# Patient Record
Sex: FEMALE | Race: WHITE | NOT HISPANIC OR LATINO | Employment: OTHER | URBAN - METROPOLITAN AREA
[De-identification: names, ages, dates, MRNs, and addresses within clinical notes are randomized per-mention and may not be internally consistent; named-entity substitution may affect disease eponyms.]

---

## 2021-03-04 ENCOUNTER — OFFICE VISIT (OUTPATIENT)
Dept: FAMILY MEDICINE | Facility: CLINIC | Age: 51
End: 2021-03-04
Payer: MEDICARE

## 2021-03-04 VITALS — TEMPERATURE: 98 F | WEIGHT: 80 LBS

## 2021-03-04 DIAGNOSIS — N30.00 ACUTE CYSTITIS WITHOUT HEMATURIA: Primary | ICD-10-CM

## 2021-03-04 DIAGNOSIS — Z00.00 ENCOUNTER FOR ANNUAL WELLNESS EXAM IN MEDICARE PATIENT: ICD-10-CM

## 2021-03-04 DIAGNOSIS — R46.89 EPISODE OF BEHAVIOR CHANGE: ICD-10-CM

## 2021-03-04 DIAGNOSIS — R53.83 FATIGUE, UNSPECIFIED TYPE: ICD-10-CM

## 2021-03-04 PROCEDURE — 99213 OFFICE O/P EST LOW 20 MIN: CPT | Mod: S$GLB,,, | Performed by: NURSE PRACTITIONER

## 2021-03-04 PROCEDURE — 99213 PR OFFICE/OUTPT VISIT, EST, LEVL III, 20-29 MIN: ICD-10-PCS | Mod: S$GLB,,, | Performed by: NURSE PRACTITIONER

## 2021-03-04 RX ORDER — OMEPRAZOLE 40 MG/1
CAPSULE, DELAYED RELEASE ORAL
COMMUNITY
Start: 2021-02-18 | End: 2022-12-02

## 2021-03-04 RX ORDER — LORAZEPAM 0.5 MG/1
0.25 TABLET ORAL 2 TIMES DAILY PRN
Qty: 5 TABLET | Refills: 0 | Status: SHIPPED | OUTPATIENT
Start: 2021-03-04 | End: 2021-04-03

## 2021-03-04 RX ORDER — NYSTATIN 100000 U/G
CREAM TOPICAL
COMMUNITY
Start: 2021-01-29 | End: 2021-11-23

## 2021-03-04 RX ORDER — BUDESONIDE 3 MG/1
CAPSULE, COATED PELLETS ORAL
COMMUNITY
Start: 2021-02-18 | End: 2021-11-23

## 2021-04-08 ENCOUNTER — OFFICE VISIT (OUTPATIENT)
Dept: FAMILY MEDICINE | Facility: CLINIC | Age: 51
End: 2021-04-08
Payer: MEDICARE

## 2021-04-08 VITALS — BODY MASS INDEX: 15.31 KG/M2 | WEIGHT: 78 LBS | HEIGHT: 60 IN

## 2021-04-08 DIAGNOSIS — E83.51 HYPOCALCEMIA: Primary | ICD-10-CM

## 2021-04-08 DIAGNOSIS — Q90.9 TRISOMY 21: ICD-10-CM

## 2021-04-08 PROCEDURE — 99213 PR OFFICE/OUTPT VISIT, EST, LEVL III, 20-29 MIN: ICD-10-PCS | Mod: S$GLB,,, | Performed by: NURSE PRACTITIONER

## 2021-04-08 PROCEDURE — 99213 OFFICE O/P EST LOW 20 MIN: CPT | Mod: S$GLB,,, | Performed by: NURSE PRACTITIONER

## 2021-04-08 RX ORDER — CALCIUM CARBONATE/VITAMIN D3 500 MG-10
1 TABLET,CHEWABLE ORAL ONCE
Qty: 90 EACH | Refills: 1 | Status: SHIPPED | OUTPATIENT
Start: 2021-04-08 | End: 2021-04-08

## 2021-04-08 RX ORDER — METRONIDAZOLE 7.5 MG/G
1 CREAM TOPICAL 2 TIMES DAILY
COMMUNITY
End: 2021-11-23

## 2021-05-12 ENCOUNTER — TELEPHONE (OUTPATIENT)
Dept: FAMILY MEDICINE | Facility: CLINIC | Age: 51
End: 2021-05-12

## 2021-05-12 ENCOUNTER — OFFICE VISIT (OUTPATIENT)
Dept: FAMILY MEDICINE | Facility: CLINIC | Age: 51
End: 2021-05-12
Payer: MEDICARE

## 2021-05-12 VITALS — BODY MASS INDEX: 15.71 KG/M2 | HEIGHT: 60 IN | WEIGHT: 80 LBS

## 2021-05-12 DIAGNOSIS — L03.90 WOUND CELLULITIS: Primary | ICD-10-CM

## 2021-05-12 DIAGNOSIS — R51.9 NONINTRACTABLE HEADACHE, UNSPECIFIED CHRONICITY PATTERN, UNSPECIFIED HEADACHE TYPE: Primary | ICD-10-CM

## 2021-05-12 PROCEDURE — 99214 OFFICE O/P EST MOD 30 MIN: CPT | Mod: S$GLB,,, | Performed by: FAMILY MEDICINE

## 2021-05-12 PROCEDURE — 99214 PR OFFICE/OUTPT VISIT, EST, LEVL IV, 30-39 MIN: ICD-10-PCS | Mod: S$GLB,,, | Performed by: FAMILY MEDICINE

## 2021-05-12 RX ORDER — ACETAMINOPHEN 500 MG
500 TABLET ORAL DAILY
Qty: 90 TABLET | Refills: 1 | Status: SHIPPED | OUTPATIENT
Start: 2021-05-12 | End: 2021-05-12

## 2021-05-12 RX ORDER — CEFADROXIL 500 MG/5ML
1 POWDER, FOR SUSPENSION ORAL 2 TIMES DAILY
Qty: 150 ML | Refills: 0 | Status: SHIPPED | OUTPATIENT
Start: 2021-05-12 | End: 2022-01-21

## 2021-05-12 RX ORDER — FLUCONAZOLE 40 MG/ML
100 POWDER, FOR SUSPENSION ORAL DAILY
Qty: 150 ML | Refills: 0 | Status: SHIPPED | OUTPATIENT
Start: 2021-05-12 | End: 2021-06-11

## 2021-05-12 RX ORDER — ACETAMINOPHEN 160 MG/5ML
325 LIQUID ORAL EVERY 6 HOURS PRN
Qty: 600 ML | Refills: 1 | Status: SHIPPED | OUTPATIENT
Start: 2021-05-12 | End: 2022-12-02

## 2021-05-12 RX ORDER — HYDROXYZINE HYDROCHLORIDE 10 MG/5ML
25 SYRUP ORAL 4 TIMES DAILY
Qty: 150 ML | Refills: 5 | Status: SHIPPED | OUTPATIENT
Start: 2021-05-12 | End: 2021-11-23

## 2021-05-17 ENCOUNTER — PATIENT MESSAGE (OUTPATIENT)
Dept: FAMILY MEDICINE | Facility: CLINIC | Age: 51
End: 2021-05-17

## 2021-06-04 ENCOUNTER — TELEPHONE (OUTPATIENT)
Dept: FAMILY MEDICINE | Facility: CLINIC | Age: 51
End: 2021-06-04

## 2021-07-01 ENCOUNTER — PATIENT MESSAGE (OUTPATIENT)
Dept: ADMINISTRATIVE | Facility: OTHER | Age: 51
End: 2021-07-01

## 2021-10-05 ENCOUNTER — TELEPHONE (OUTPATIENT)
Dept: FAMILY MEDICINE | Facility: CLINIC | Age: 51
End: 2021-10-05

## 2021-11-22 ENCOUNTER — PATIENT OUTREACH (OUTPATIENT)
Dept: ADMINISTRATIVE | Facility: OTHER | Age: 51
End: 2021-11-22
Payer: MEDICARE

## 2021-11-22 ENCOUNTER — OFFICE VISIT (OUTPATIENT)
Dept: PODIATRY | Facility: CLINIC | Age: 51
End: 2021-11-22
Payer: MEDICARE

## 2021-11-22 VITALS — HEIGHT: 55 IN | BODY MASS INDEX: 18.05 KG/M2 | WEIGHT: 78 LBS | RESPIRATION RATE: 16 BRPM

## 2021-11-22 DIAGNOSIS — M20.11 VALGUS DEFORMITY OF BOTH GREAT TOES: ICD-10-CM

## 2021-11-22 DIAGNOSIS — Z12.11 ENCOUNTER FOR FIT (FECAL IMMUNOCHEMICAL TEST) SCREENING: Primary | ICD-10-CM

## 2021-11-22 DIAGNOSIS — M20.42 HAMMER TOES OF BOTH FEET: ICD-10-CM

## 2021-11-22 DIAGNOSIS — M20.12 VALGUS DEFORMITY OF BOTH GREAT TOES: ICD-10-CM

## 2021-11-22 DIAGNOSIS — L97.501 ULCER OF FOOT, LIMITED TO BREAKDOWN OF SKIN, UNSPECIFIED LATERALITY: Primary | ICD-10-CM

## 2021-11-22 DIAGNOSIS — M20.41 HAMMER TOES OF BOTH FEET: ICD-10-CM

## 2021-11-22 DIAGNOSIS — Z12.31 ENCOUNTER FOR SCREENING MAMMOGRAM FOR MALIGNANT NEOPLASM OF BREAST: ICD-10-CM

## 2021-11-22 DIAGNOSIS — B35.1 ONYCHOMYCOSIS DUE TO DERMATOPHYTE: ICD-10-CM

## 2021-11-22 DIAGNOSIS — Q92.9 TRISOMY: ICD-10-CM

## 2021-11-22 PROCEDURE — 99214 OFFICE O/P EST MOD 30 MIN: CPT | Mod: PBBFAC | Performed by: PODIATRIST

## 2021-11-22 PROCEDURE — 99203 PR OFFICE/OUTPT VISIT, NEW, LEVL III, 30-44 MIN: ICD-10-PCS | Mod: S$PBB,,, | Performed by: PODIATRIST

## 2021-11-22 PROCEDURE — 99999 PR PBB SHADOW E&M-EST. PATIENT-LVL IV: CPT | Mod: PBBFAC,,, | Performed by: PODIATRIST

## 2021-11-22 PROCEDURE — 99203 OFFICE O/P NEW LOW 30 MIN: CPT | Mod: S$PBB,,, | Performed by: PODIATRIST

## 2021-11-22 PROCEDURE — 99999 PR PBB SHADOW E&M-EST. PATIENT-LVL IV: ICD-10-PCS | Mod: PBBFAC,,, | Performed by: PODIATRIST

## 2021-11-22 RX ORDER — METRONIDAZOLE 7.5 MG/G
1 CREAM TOPICAL
COMMUNITY
End: 2021-11-23

## 2021-11-22 RX ORDER — SERTRALINE HYDROCHLORIDE 25 MG/1
25 TABLET, FILM COATED ORAL
COMMUNITY
Start: 2021-11-08 | End: 2021-11-23

## 2021-11-22 RX ORDER — MUPIROCIN CALCIUM 20 MG/G
CREAM TOPICAL
COMMUNITY
End: 2021-11-23

## 2021-11-22 RX ORDER — NYSTATIN 100000 U/G
CREAM TOPICAL
COMMUNITY
Start: 2021-01-29 | End: 2021-11-23

## 2021-11-22 RX ORDER — METRONIDAZOLE 10 MG/G
GEL TOPICAL
COMMUNITY
Start: 2021-08-15 | End: 2021-11-23

## 2021-11-22 RX ORDER — ATORVASTATIN CALCIUM 40 MG/1
40 TABLET, FILM COATED ORAL
COMMUNITY
Start: 2021-11-03 | End: 2022-02-15 | Stop reason: SDUPTHER

## 2021-11-22 RX ORDER — OMEPRAZOLE 40 MG/1
1 CAPSULE, DELAYED RELEASE ORAL DAILY
COMMUNITY
Start: 2021-02-18 | End: 2021-11-23 | Stop reason: SDUPTHER

## 2021-11-22 RX ORDER — BUDESONIDE 3 MG/1
1 CAPSULE, COATED PELLETS ORAL DAILY
COMMUNITY
Start: 2021-02-09 | End: 2022-12-02

## 2021-11-22 RX ORDER — CYANOCOBALAMIN (VITAMIN B-12) 250 MCG
250 TABLET ORAL EVERY MORNING
COMMUNITY
Start: 2021-10-06

## 2021-11-22 RX ORDER — LORAZEPAM 0.5 MG/1
0.25 TABLET ORAL 2 TIMES DAILY PRN
Status: ON HOLD | COMMUNITY
Start: 2021-03-04 | End: 2022-11-03

## 2021-11-22 RX ORDER — CALCIUM CARBONATE/VITAMIN D3 500 MG-10
1 TABLET,CHEWABLE ORAL DAILY
COMMUNITY
Start: 2021-04-08

## 2021-11-22 RX ORDER — BUDESONIDE 3 MG/1
9 CAPSULE, COATED PELLETS ORAL
COMMUNITY
Start: 2021-11-16 | End: 2021-11-23

## 2021-11-22 RX ORDER — CYANOCOBALAMIN (VITAMIN B-12) 250 MCG
250 TABLET ORAL
COMMUNITY
Start: 2021-10-05

## 2021-11-23 PROBLEM — M20.42 HAMMER TOES OF BOTH FEET: Status: ACTIVE | Noted: 2021-11-23

## 2021-11-23 PROBLEM — Q92.9 TRISOMY: Status: ACTIVE | Noted: 2021-11-23

## 2021-11-23 PROBLEM — B35.1 ONYCHOMYCOSIS DUE TO DERMATOPHYTE: Status: ACTIVE | Noted: 2021-11-23

## 2021-11-23 PROBLEM — M20.12 VALGUS DEFORMITY OF BOTH GREAT TOES: Status: ACTIVE | Noted: 2021-11-23

## 2021-11-23 PROBLEM — M20.41 HAMMER TOES OF BOTH FEET: Status: ACTIVE | Noted: 2021-11-23

## 2021-11-23 PROBLEM — L97.501 ULCER OF FOOT, LIMITED TO BREAKDOWN OF SKIN: Status: ACTIVE | Noted: 2021-11-23

## 2021-11-23 PROBLEM — M20.11 VALGUS DEFORMITY OF BOTH GREAT TOES: Status: ACTIVE | Noted: 2021-11-23

## 2022-01-10 ENCOUNTER — PATIENT MESSAGE (OUTPATIENT)
Dept: ADMINISTRATIVE | Facility: HOSPITAL | Age: 52
End: 2022-01-10
Payer: MEDICARE

## 2022-01-21 ENCOUNTER — OFFICE VISIT (OUTPATIENT)
Dept: FAMILY MEDICINE | Facility: CLINIC | Age: 52
End: 2022-01-21
Payer: MEDICARE

## 2022-01-21 VITALS — WEIGHT: 75.13 LBS | BODY MASS INDEX: 17.39 KG/M2 | HEIGHT: 55 IN | TEMPERATURE: 98 F

## 2022-01-21 DIAGNOSIS — E78.5 HYPERLIPIDEMIA, UNSPECIFIED HYPERLIPIDEMIA TYPE: ICD-10-CM

## 2022-01-21 DIAGNOSIS — Q90.9 TRISOMY 21: ICD-10-CM

## 2022-01-21 DIAGNOSIS — J06.9 UPPER RESPIRATORY TRACT INFECTION, UNSPECIFIED TYPE: Primary | ICD-10-CM

## 2022-01-21 PROCEDURE — 99213 OFFICE O/P EST LOW 20 MIN: CPT | Mod: S$GLB,,, | Performed by: FAMILY MEDICINE

## 2022-01-21 PROCEDURE — 99213 PR OFFICE/OUTPT VISIT, EST, LEVL III, 20-29 MIN: ICD-10-PCS | Mod: S$GLB,,, | Performed by: FAMILY MEDICINE

## 2022-01-21 RX ORDER — MONTELUKAST SODIUM 10 MG/1
10 TABLET ORAL NIGHTLY
Qty: 10 TABLET | Refills: 0 | Status: SHIPPED | OUTPATIENT
Start: 2022-01-21 | End: 2022-12-02

## 2022-01-21 NOTE — PROGRESS NOTES
Subjective:       Patient ID: Anabella Potter is a 51 y.o. female.    Chief Complaint: Nasal Congestion (2 days)      Review of patient's allergies indicates:  No Known Allergies   Head congested not eating 2 days    Review of Systems   Constitutional: Negative for chills, fatigue, fever and unexpected weight change.   HENT: Negative for ear pain, rhinorrhea, sinus pressure/congestion, sneezing and sore throat.    Eyes: Negative for photophobia and pain.   Respiratory: Negative for chest tightness, shortness of breath and wheezing.    Cardiovascular: Negative for chest pain.   Gastrointestinal: Negative for abdominal distention, abdominal pain, constipation, diarrhea and nausea.   Endocrine: Negative for polydipsia, polyphagia and polyuria.   Genitourinary: Negative for dysuria, frequency, menstrual problem, pelvic pain and urgency.   Musculoskeletal: Negative for back pain and joint swelling.   Integumentary:  Negative for rash, wound, breast mass and breast discharge.   Allergic/Immunologic: Negative for immunocompromised state.   Neurological: Negative for dizziness, vertigo, weakness and headaches.   Psychiatric/Behavioral: Negative for agitation, dysphoric mood and sleep disturbance.   All other systems reviewed and are negative.  Breast: Negative for mass        Objective:      Vitals:    01/21/22 1514   Temp: 98.3 °F (36.8 °C)     Physical Exam  Vitals and nursing note reviewed.   Constitutional:       Appearance: Normal appearance.   HENT:      Head: Normocephalic.      Right Ear: Tympanic membrane normal.      Left Ear: Tympanic membrane normal.      Nose: Nose normal.      Mouth/Throat:      Mouth: Mucous membranes are moist.   Eyes:      Pupils: Pupils are equal, round, and reactive to light.   Cardiovascular:      Rate and Rhythm: Normal rate and regular rhythm.   Pulmonary:      Effort: Pulmonary effort is normal.   Abdominal:      General: Abdomen is flat. Bowel sounds are normal.      Palpations:  Abdomen is soft.   Musculoskeletal:         General: Normal range of motion.   Skin:     General: Skin is warm and dry.   Neurological:      General: No focal deficit present.      Mental Status: She is alert.   Psychiatric:         Mood and Affect: Mood normal.         Behavior: Behavior normal.       does not look toxic,    Assessment:       1. Upper respiratory tract infection, unspecified type    2. Trisomy 21    3. Hyperlipidemia, unspecified hyperlipidemia type        Plan:       Upper respiratory tract infection, unspecified type    Trisomy 21    Hyperlipidemia, unspecified hyperlipidemia type           No follow-ups on file.   Reassurance simple cold

## 2022-01-26 ENCOUNTER — OFFICE VISIT (OUTPATIENT)
Dept: FAMILY MEDICINE | Facility: CLINIC | Age: 52
End: 2022-01-26
Payer: MEDICARE

## 2022-01-26 VITALS — BODY MASS INDEX: 16.98 KG/M2 | TEMPERATURE: 99 F | HEIGHT: 55 IN | RESPIRATION RATE: 18 BRPM | WEIGHT: 73.38 LBS

## 2022-01-26 DIAGNOSIS — J06.9 UPPER RESPIRATORY TRACT INFECTION, UNSPECIFIED TYPE: Primary | ICD-10-CM

## 2022-01-26 DIAGNOSIS — R34 DECREASED URINATION: ICD-10-CM

## 2022-01-26 PROCEDURE — 99213 PR OFFICE/OUTPT VISIT, EST, LEVL III, 20-29 MIN: ICD-10-PCS | Mod: S$GLB,,, | Performed by: NURSE PRACTITIONER

## 2022-01-26 PROCEDURE — 99213 OFFICE O/P EST LOW 20 MIN: CPT | Mod: S$GLB,,, | Performed by: NURSE PRACTITIONER

## 2022-01-26 RX ORDER — CEFDINIR 300 MG/1
300 CAPSULE ORAL 2 TIMES DAILY
Qty: 14 CAPSULE | Refills: 0 | Status: SHIPPED | OUTPATIENT
Start: 2022-01-26 | End: 2022-02-05

## 2022-01-26 RX ORDER — ONDANSETRON 4 MG/1
4 TABLET, ORALLY DISINTEGRATING ORAL EVERY 8 HOURS PRN
Qty: 30 TABLET | Refills: 0 | Status: SHIPPED | OUTPATIENT
Start: 2022-01-26 | End: 2022-12-02

## 2022-01-26 NOTE — PROGRESS NOTES
"Subjective:       Patient ID: Anabella Potter is a 51 y.o. female.    Chief Complaint: Anorexia    Ms. Potter presents with her primary caregiver for "just not being herself'. She has not been willing to eat or drink over the last few days. Last week began having s/s of congestion/ cough on thurs/Friday. Today, the caregiver states that she is still worried, although Kesha seems "better today".  Note that the patient is nonverbal, intellectually disabled, does not allow physical examination/auscultation.    ROS: Unable to be performed due to pt disability        Review of patient's allergies indicates:  No Known Allergies  Objective:     Vitals:    01/26/22 0918   Resp: 18   Temp: 98.6 °F (37 °C)     -WEIGHT   Body mass index is 17.7 kg/m².     Physical Exam  Vitals reviewed: Limited exam due to pt refusal.   Constitutional:       Appearance: Normal appearance.   HENT:      Head: Normocephalic.   Pulmonary:      Effort: Pulmonary effort is normal.      Comments: No significant coughing or noted wheezing.  Abdominal:      General: Abdomen is flat.      Comments: To general inspection   Skin:     General: Skin is warm.      Capillary Refill: Capillary refill takes less than 2 seconds.      Comments: Allows minimal touch   Neurological:      Mental Status: She is alert.   Psychiatric:      Comments: Behavior is stable, she usually does not allow exam         Assessment:       1. Upper respiratory tract infection, unspecified type    2. Decreased urination        Plan:       Upper respiratory tract infection, unspecified type  -     X-Ray Chest PA And Lateral; Future; Expected date: 01/26/2022    Decreased urination  -     URINALYSIS; Future; Expected date: 01/26/2022    Other orders  -     cefdinir (OMNICEF) 300 MG capsule; Take 1 capsule (300 mg total) by mouth 2 (two) times daily. for 10 days  Dispense: 14 capsule; Refill: 0  -     ondansetron (ZOFRAN-ODT) 4 MG TbDL; Take 1 tablet (4 mg total) by mouth every 8 " (eight) hours as needed.  Dispense: 30 tablet; Refill: 0     At this point, if she has had a COVID infection, the 5 day quarantine has . Continue to try to have pt wear a mask, which is difficult.  Empiric trial of medication,   Add ondansetron for nausea prior to meals.  DO UA, collect at [facility], and CXR.  Followup for results, any changes in status.  Last summer she was hospitalized for vitamin deficiency, is still taking those vitamins

## 2022-02-15 RX ORDER — ATORVASTATIN CALCIUM 40 MG/1
40 TABLET, FILM COATED ORAL DAILY
Qty: 90 TABLET | Refills: 1 | Status: SHIPPED | OUTPATIENT
Start: 2022-02-15 | End: 2022-08-25 | Stop reason: SDUPTHER

## 2022-02-15 NOTE — TELEPHONE ENCOUNTER
----- Message from May Cabrera sent at 2/15/2022  3:14 PM CST -----  Contact: pt  Type:  RX Refill Request    Who Called:  pt    Refill or New Rx:  refill    RX Name and Strength:  atorvastatin (LIPITOR) 40 MG tablet    How is the patient currently taking it? (ex. 1XDay):  As Directed  Is this a 30 day or 90 day RX:  90    Preferred Pharmacy with phone number:    [facility] [place] - [address] [address]  [address] [address] [address]  Phone: [phone] Fax: [phone]      Local or Mail Order:  mail    Ordering Provider:  Grey Cedeno Call Back Number:  [phone]    Additional Information:  Please contact pt upon completion-Thank you~

## 2022-03-22 ENCOUNTER — PATIENT MESSAGE (OUTPATIENT)
Dept: ADMINISTRATIVE | Facility: HOSPITAL | Age: 52
End: 2022-03-22
Payer: MEDICARE

## 2022-03-28 ENCOUNTER — TELEPHONE (OUTPATIENT)
Dept: PRIMARY CARE CLINIC | Facility: CLINIC | Age: 52
End: 2022-03-28
Payer: MEDICARE

## 2022-04-01 ENCOUNTER — HOSPITAL ENCOUNTER (OUTPATIENT)
Dept: RADIOLOGY | Facility: CLINIC | Age: 52
Discharge: HOME | End: 2022-04-01
Attending: FAMILY MEDICINE
Payer: MEDICARE

## 2022-04-01 DIAGNOSIS — Z12.31 ENCOUNTER FOR SCREENING MAMMOGRAM FOR MALIGNANT NEOPLASM OF BREAST: ICD-10-CM

## 2022-05-31 ENCOUNTER — PATIENT MESSAGE (OUTPATIENT)
Dept: ADMINISTRATIVE | Facility: HOSPITAL | Age: 52
End: 2022-05-31
Payer: MEDICARE

## 2022-06-03 ENCOUNTER — PATIENT OUTREACH (OUTPATIENT)
Dept: ADMINISTRATIVE | Facility: HOSPITAL | Age: 52
End: 2022-06-03
Payer: MEDICARE

## 2022-06-03 DIAGNOSIS — Z12.31 BREAST CANCER SCREENING BY MAMMOGRAM: Primary | ICD-10-CM

## 2022-06-06 ENCOUNTER — OFFICE VISIT (OUTPATIENT)
Dept: PODIATRY | Facility: CLINIC | Age: 52
End: 2022-06-06
Payer: MEDICARE

## 2022-06-06 VITALS — HEIGHT: 55 IN | BODY MASS INDEX: 15.73 KG/M2 | WEIGHT: 68 LBS

## 2022-06-06 DIAGNOSIS — L97.501 ULCER OF FOOT, LIMITED TO BREAKDOWN OF SKIN, UNSPECIFIED LATERALITY: Primary | ICD-10-CM

## 2022-06-06 DIAGNOSIS — Q92.9 TRISOMY: ICD-10-CM

## 2022-06-06 DIAGNOSIS — L60.0 INGROWN NAIL: ICD-10-CM

## 2022-06-06 DIAGNOSIS — M20.41 HAMMER TOES OF BOTH FEET: ICD-10-CM

## 2022-06-06 DIAGNOSIS — M20.11 VALGUS DEFORMITY OF BOTH GREAT TOES: ICD-10-CM

## 2022-06-06 DIAGNOSIS — M20.12 VALGUS DEFORMITY OF BOTH GREAT TOES: ICD-10-CM

## 2022-06-06 DIAGNOSIS — M20.42 HAMMER TOES OF BOTH FEET: ICD-10-CM

## 2022-06-06 DIAGNOSIS — B35.1 ONYCHOMYCOSIS DUE TO DERMATOPHYTE: ICD-10-CM

## 2022-06-06 DIAGNOSIS — I73.9 PERIPHERAL VASCULAR DISEASE: ICD-10-CM

## 2022-06-06 PROCEDURE — 99214 PR OFFICE/OUTPT VISIT, EST, LEVL IV, 30-39 MIN: ICD-10-PCS | Mod: S$PBB,,, | Performed by: PODIATRIST

## 2022-06-06 PROCEDURE — 99213 OFFICE O/P EST LOW 20 MIN: CPT | Mod: PBBFAC | Performed by: PODIATRIST

## 2022-06-06 PROCEDURE — 99999 PR PBB SHADOW E&M-EST. PATIENT-LVL III: ICD-10-PCS | Mod: PBBFAC,,, | Performed by: PODIATRIST

## 2022-06-06 PROCEDURE — 99999 PR PBB SHADOW E&M-EST. PATIENT-LVL III: CPT | Mod: PBBFAC,,, | Performed by: PODIATRIST

## 2022-06-06 PROCEDURE — 99214 OFFICE O/P EST MOD 30 MIN: CPT | Mod: S$PBB,,, | Performed by: PODIATRIST

## 2022-06-07 NOTE — PROGRESS NOTES
Subjective:       Patient ID: Anabella Potter is a 52 y.o. female.    Chief Complaint: Follow-up, Nail Problem, and Foot Pain    Patient presents today with a chaperone from the facility for which she resides.  Patient's chaperone indicates the patient has had multiple ulcerations on both feet she had gone to Wound Care previously.  Patient does have Down syndrome as well as other related health issues she was non communicative today and somewhat combative.    Past Medical History:   Diagnosis Date    Anorexia     Trisomy      History reviewed. No pertinent surgical history.  History reviewed. No pertinent family history.  Social History     Socioeconomic History    Marital status: Single   Occupational History    Occupation: disabled   Tobacco Use    Smoking status: Never Smoker    Smokeless tobacco: Never Used   Substance and Sexual Activity    Alcohol use: Never    Drug use: Never    Sexual activity: Never       Current Outpatient Medications   Medication Sig Dispense Refill    acetaminophen (TYLENOL) 160 mg/5 mL Liqd Take 10.2 mLs (326.4 mg total) by mouth every 6 (six) hours as needed (PAIN). 600 mL 1    atorvastatin (LIPITOR) 40 MG tablet Take 1 tablet (40 mg total) by mouth once daily. 90 tablet 1    budesonide (ENTOCORT EC) 3 mg capsule Take 1 capsule by mouth once daily.      calcium carbonate-vitamin D3 500 mg(1,250mg) -400 unit Chew Take 1 tablet by mouth once daily.      cyanocobalamin (VITAMIN B-12) 250 MCG tablet Take 250 mcg by mouth.      LORazepam (ATIVAN) 0.5 MG tablet Take 0.25 mg by mouth 2 (two) times daily as needed.      montelukast (SINGULAIR) 10 mg tablet Take 1 tablet (10 mg total) by mouth every evening. 10 tablet 0    omeprazole (PRILOSEC) 40 MG capsule       ondansetron (ZOFRAN-ODT) 4 MG TbDL Take 1 tablet (4 mg total) by mouth every 8 (eight) hours as needed. 30 tablet 0    VITAMIN B-12 250 MCG tablet Take 250 mcg by mouth every morning.       No current  "facility-administered medications for this visit.     Review of patient's allergies indicates:  No Known Allergies    Review of Systems   Constitutional: Positive for appetite change.   Musculoskeletal: Positive for arthralgias, gait problem and joint swelling.   Skin: Positive for color change and wound.   All other systems reviewed and are negative.      Objective:      Vitals:    06/06/22 1144   Weight: 30.8 kg (68 lb)   Height: 4' 6" (1.372 m)     Physical Exam  Vitals and nursing note reviewed. Exam conducted with a chaperone present.   Constitutional:       Appearance: She is ill-appearing.   Cardiovascular:      Pulses:           Dorsalis pedis pulses are 0 on the right side and 0 on the left side.        Posterior tibial pulses are 0 on the right side and 0 on the left side.   Pulmonary:      Effort: Pulmonary effort is normal.   Musculoskeletal:         General: Tenderness and deformity present.      Right foot: Decreased range of motion. Deformity, bunion and prominent metatarsal heads present.      Left foot: Decreased range of motion. Deformity, bunion and prominent metatarsal heads present.        Feet:    Feet:      Right foot:      Protective Sensation: 2 sites tested. 2 sites sensed.      Skin integrity: Skin breakdown, callus and dry skin present.      Toenail Condition: Right toenails are abnormally thick and long. Fungal disease present.     Left foot:      Protective Sensation: 2 sites tested. 2 sites sensed.      Skin integrity: Skin breakdown, callus and dry skin present.      Toenail Condition: Left toenails are abnormally thick and long. Fungal disease present.  Skin:     Capillary Refill: Capillary refill takes more than 3 seconds.      Findings: Erythema and lesion present.   Neurological:      General: No focal deficit present.   Psychiatric:         Speech: She is noncommunicative.         Behavior: Behavior is agitated.          Assessment:       1. Ulcer of foot, limited to breakdown of " skin, unspecified laterality    2. Hammer toes of both feet    3. Ingrown nail    4. Peripheral vascular disease    5. Trisomy    6. Onychomycosis due to dermatophyte    7. Valgus deformity of both great toes        Plan:       Patient presents today with a chaperone from the facility for which she resides.  Patient's chaperone indicates the patient has had multiple ulcerations on both feet she had gone to Wound Care previously.  Patient does have Down syndrome as well as other related health issues she was non communicative today and somewhat combative.   Evaluation of the patient was did very difficult today she did not want me looking at her feeding kept pulling her feet away she does have multiple digits with purple blue hue of the digits and pre ulcerative hyperkeratotic lesions on the plantar forefoot secondary to digital contracture and bunion deformity bilateral.  Patient does have obvious signs of fungal infection of the toenails many of the toenails are long they are discolored dystrophic thickened with findings consistent with onychomycosis.  Patient's chaperone indicated the patient was given Valium prior to today's visit however the patient was still very combative and made it very difficult to evaluate the patient's feet and treat the patient.  The chaperone indicated she has been given permission to restrain the patient so that I am able to trim the ingrowing toenails my nursing staff also helped to respectfully and appropriately restrained the patient to prevent injury to the patient's foot while trimming the ingrowing toenails which were present on both feet.  I was not able to debride the pre ulcerative hyperkeratotic lesions because of the patient's kicking and constant pulling of her feet I was fearful that utilizing a scalpel to do this could lead to injury so I did not recommend this instead I have recommended the application of 40% urea cream applied to the patient's feet twice a day every day  this will slowly over time breakdown the callus tissue and buildup which should give the patient significant relief.  Recommended follow-up is going to be as needed.  Patient did have a progressive vascular compromise in the 2nd digit on the right the toe was purple on examination there were no signs of infection in the toe itself for surrounding skin.  This note was created using M*Modal voice recognition software that occasionally misinterpreted phrases or words.

## 2022-08-24 ENCOUNTER — PATIENT MESSAGE (OUTPATIENT)
Dept: ADMINISTRATIVE | Facility: HOSPITAL | Age: 52
End: 2022-08-24
Payer: MEDICARE

## 2022-08-25 RX ORDER — ATORVASTATIN CALCIUM 40 MG/1
40 TABLET, FILM COATED ORAL DAILY
Qty: 90 TABLET | Refills: 1 | Status: SHIPPED | OUTPATIENT
Start: 2022-08-25 | End: 2023-05-26 | Stop reason: SDUPTHER

## 2022-08-25 NOTE — TELEPHONE ENCOUNTER
Last Office Visit: 1/21/2022    ----- Message from Anabella Remington sent at 8/25/2022  9:44 AM CDT -----  Contact: teresa ornelas  Type:  RX Refill Request    Who Called: teresa ornelas  Refill or New Rx:  refill  RX Name and Strength:  atorvastatin (LIPITOR) 40 MG tablet 90 tablet   Sig - Route: Take 1 tablet (40 mg total) by mouth once daily. - Oral      How is the patient currently taking it? (ex. 1XDay):  1x  Is this a 30 day or 90 day RX:  90  Preferred Pharmacy with phone number:    [facility] [place] [facility] [id number] [address]  Phone: [phone] Fax: [phone]        Local or Mail Order:  local  Ordering Provider:  brooke Cedeno Call Back Number:  [phone]  Additional Information:

## 2022-09-12 ENCOUNTER — TELEPHONE (OUTPATIENT)
Dept: FAMILY MEDICINE | Facility: CLINIC | Age: 52
End: 2022-09-12
Payer: MEDICARE

## 2022-09-12 NOTE — TELEPHONE ENCOUNTER
Call placed to Ana/Joni Szymanski states the patient is uncomfortable and the affected area continues drain with green drainage.    ----- Message from Anabella Macedo sent at 9/12/2022  4:11 PM CDT -----  Contact: pt  Type:  RX Refill Request    Who Called:  angelia szymanski  Refill or New Rx:  rx  RX Name and Strength:  pt had a boil on butt has busted open, needs some kind of cream  How is the patient currently taking it? (ex. 1XDay):  as order  Is this a 30 day or 90 day RX:  as order  Preferred Pharmacy with phone number:        [id number] - [place], [place] - [address]  [address]  [place] [address]  Phone: [phone] Fax: [phone]          Local or Mail Order:  local  Ordering Provider:  brooke Cedeno Call Back Number:  [phone]  Additional Information:

## 2022-09-12 NOTE — TELEPHONE ENCOUNTER
Second call made to Ana/Joni Szymanski with 's orders and informed her that medications has been sent to [facility] for patient.      Message duplicate. Original message sent to Ms. Ocasio-High priority.  Call placed to Ana/Joni Szymanski states the patient is uncomfortable and the affected area continues drain with green drainage.    ----- Message from Anabella Macedo sent at 9/12/2022  4:11 PM CDT -----  Contact: pt  Type:  RX Refill Request    Who Called:  angelia szymanski  Refill or New Rx:  rx  RX Name and Strength:  pt had a boil on butt has busted open, needs some kind of cream  How is the patient currently taking it? (ex. 1XDay):  as order  Is this a 30 day or 90 day RX:  as order  Preferred Pharmacy with phone number:        [id number] - [place], [place] - [address]  [address]  [facility] [address]  Phone: [phone] Fax: [phone]          Local or Mail Order:  local  Ordering Provider:  brooke Cedeno Call Back Number:  [phone]  Additional Information:

## 2022-09-13 RX ORDER — MUPIROCIN 20 MG/G
OINTMENT TOPICAL 3 TIMES DAILY
Qty: 30 G | Refills: 1 | Status: SHIPPED | OUTPATIENT
Start: 2022-09-13 | End: 2022-12-02

## 2022-09-13 RX ORDER — SULFAMETHOXAZOLE AND TRIMETHOPRIM 800; 160 MG/1; MG/1
1 TABLET ORAL 2 TIMES DAILY
Qty: 20 TABLET | Refills: 0 | Status: ON HOLD | OUTPATIENT
Start: 2022-09-13 | End: 2022-11-03

## 2022-10-10 ENCOUNTER — PATIENT MESSAGE (OUTPATIENT)
Dept: ADMINISTRATIVE | Facility: HOSPITAL | Age: 52
End: 2022-10-10
Payer: MEDICARE

## 2022-11-02 ENCOUNTER — HOSPITAL ENCOUNTER (INPATIENT)
Facility: HOSPITAL | Age: 52
LOS: 2 days | Discharge: HOME | DRG: 086 | End: 2022-11-04
Attending: EMERGENCY MEDICINE | Admitting: PSYCHIATRY & NEUROLOGY
Payer: MEDICARE

## 2022-11-02 DIAGNOSIS — I60.9 SAH (SUBARACHNOID HEMORRHAGE): ICD-10-CM

## 2022-11-02 DIAGNOSIS — R55 SYNCOPE AND COLLAPSE: ICD-10-CM

## 2022-11-02 DIAGNOSIS — R56.9 SEIZURE: ICD-10-CM

## 2022-11-02 DIAGNOSIS — I60.9 SUBARACHNOID HEMORRHAGE: Primary | ICD-10-CM

## 2022-11-02 DIAGNOSIS — R55 SYNCOPE: ICD-10-CM

## 2022-11-02 PROBLEM — F81.9 INTELLECTUAL DELAY: Status: ACTIVE | Noted: 2022-11-02

## 2022-11-02 LAB
ABO + RH BLD: NORMAL
BACTERIA #/AREA URNS AUTO: NORMAL /HPF
BASOPHILS # BLD AUTO: 0.05 K/UL (ref 0–0.2)
BASOPHILS NFR BLD: 0.7 % (ref 0–1.9)
BILIRUB UR QL STRIP: NEGATIVE
BLD GP AB SCN CELLS X3 SERPL QL: NORMAL
CHOLEST SERPL-MCNC: 142 MG/DL (ref 120–199)
CHOLEST/HDLC SERPL: 3 {RATIO} (ref 2–5)
CLARITY UR REFRACT.AUTO: CLEAR
COLOR UR AUTO: YELLOW
DIFFERENTIAL METHOD: ABNORMAL
EOSINOPHIL # BLD AUTO: 0 K/UL (ref 0–0.5)
EOSINOPHIL NFR BLD: 0 % (ref 0–8)
ERYTHROCYTE [DISTWIDTH] IN BLOOD BY AUTOMATED COUNT: 14.2 % (ref 11.5–14.5)
ESTIMATED AVG GLUCOSE: 111 MG/DL (ref 68–131)
GLUCOSE UR QL STRIP: NEGATIVE
HBA1C MFR BLD: 5.5 % (ref 4–5.6)
HCT VFR BLD AUTO: 41.8 % (ref 37–48.5)
HDLC SERPL-MCNC: 48 MG/DL (ref 40–75)
HDLC SERPL: 33.8 % (ref 20–50)
HGB BLD-MCNC: 13.9 G/DL (ref 12–16)
HGB UR QL STRIP: NEGATIVE
IMM GRANULOCYTES # BLD AUTO: 0.03 K/UL (ref 0–0.04)
IMM GRANULOCYTES NFR BLD AUTO: 0.4 % (ref 0–0.5)
INR PPP: 1 (ref 0.8–1.2)
KETONES UR QL STRIP: NEGATIVE
LDLC SERPL CALC-MCNC: 83 MG/DL (ref 63–159)
LEUKOCYTE ESTERASE UR QL STRIP: ABNORMAL
LYMPHOCYTES # BLD AUTO: 1 K/UL (ref 1–4.8)
LYMPHOCYTES NFR BLD: 13.8 % (ref 18–48)
MCH RBC QN AUTO: 33.8 PG (ref 27–31)
MCHC RBC AUTO-ENTMCNC: 33.3 G/DL (ref 32–36)
MCV RBC AUTO: 102 FL (ref 82–98)
MICROSCOPIC COMMENT: NORMAL
MONOCYTES # BLD AUTO: 0.5 K/UL (ref 0.3–1)
MONOCYTES NFR BLD: 7.1 % (ref 4–15)
NEUTROPHILS # BLD AUTO: 5.4 K/UL (ref 1.8–7.7)
NEUTROPHILS NFR BLD: 78 % (ref 38–73)
NITRITE UR QL STRIP: NEGATIVE
NONHDLC SERPL-MCNC: 94 MG/DL
NRBC BLD-RTO: 0 /100 WBC
PH UR STRIP: 8 [PH] (ref 5–8)
PLATELET # BLD AUTO: 261 K/UL (ref 150–450)
PMV BLD AUTO: 10.5 FL (ref 9.2–12.9)
PROT UR QL STRIP: NEGATIVE
PROTHROMBIN TIME: 10.9 SEC (ref 9–12.5)
RBC # BLD AUTO: 4.11 M/UL (ref 4–5.4)
RBC #/AREA URNS AUTO: 2 /HPF (ref 0–4)
SP GR UR STRIP: 1.01 (ref 1–1.03)
T4 FREE SERPL-MCNC: 0.65 NG/DL (ref 0.71–1.51)
TRIGL SERPL-MCNC: 55 MG/DL (ref 30–150)
TSH SERPL DL<=0.005 MIU/L-ACNC: 19.8 UIU/ML (ref 0.4–4)
URN SPEC COLLECT METH UR: ABNORMAL
WBC # BLD AUTO: 6.9 K/UL (ref 3.9–12.7)
WBC #/AREA URNS AUTO: 3 /HPF (ref 0–5)

## 2022-11-02 PROCEDURE — 63600175 PHARM REV CODE 636 W HCPCS: Performed by: EMERGENCY MEDICINE

## 2022-11-02 PROCEDURE — 81001 URINALYSIS AUTO W/SCOPE: CPT | Performed by: PHYSICIAN ASSISTANT

## 2022-11-02 PROCEDURE — 99291 CRITICAL CARE FIRST HOUR: CPT | Mod: ,,, | Performed by: EMERGENCY MEDICINE

## 2022-11-02 PROCEDURE — 83036 HEMOGLOBIN GLYCOSYLATED A1C: CPT | Performed by: PHYSICIAN ASSISTANT

## 2022-11-02 PROCEDURE — 99285 EMERGENCY DEPT VISIT HI MDM: CPT | Mod: 25

## 2022-11-02 PROCEDURE — 85025 COMPLETE CBC W/AUTO DIFF WBC: CPT | Performed by: EMERGENCY MEDICINE

## 2022-11-02 PROCEDURE — 93005 ELECTROCARDIOGRAM TRACING: CPT

## 2022-11-02 PROCEDURE — 93010 EKG 12-LEAD: ICD-10-PCS | Mod: ,,, | Performed by: INTERNAL MEDICINE

## 2022-11-02 PROCEDURE — 99291 PR CRITICAL CARE, E/M 30-74 MINUTES: ICD-10-PCS | Mod: FS,,, | Performed by: PHYSICIAN ASSISTANT

## 2022-11-02 PROCEDURE — 99291 PR CRITICAL CARE, E/M 30-74 MINUTES: ICD-10-PCS | Mod: ,,, | Performed by: EMERGENCY MEDICINE

## 2022-11-02 PROCEDURE — 86901 BLOOD TYPING SEROLOGIC RH(D): CPT | Performed by: EMERGENCY MEDICINE

## 2022-11-02 PROCEDURE — 96374 THER/PROPH/DIAG INJ IV PUSH: CPT

## 2022-11-02 PROCEDURE — 80061 LIPID PANEL: CPT | Performed by: PHYSICIAN ASSISTANT

## 2022-11-02 PROCEDURE — 85610 PROTHROMBIN TIME: CPT | Performed by: EMERGENCY MEDICINE

## 2022-11-02 PROCEDURE — 94761 N-INVAS EAR/PLS OXIMETRY MLT: CPT

## 2022-11-02 PROCEDURE — 93010 ELECTROCARDIOGRAM REPORT: CPT | Mod: ,,, | Performed by: INTERNAL MEDICINE

## 2022-11-02 PROCEDURE — 99291 CRITICAL CARE FIRST HOUR: CPT | Mod: FS,,, | Performed by: PHYSICIAN ASSISTANT

## 2022-11-02 PROCEDURE — 84439 ASSAY OF FREE THYROXINE: CPT | Performed by: PHYSICIAN ASSISTANT

## 2022-11-02 PROCEDURE — 20000000 HC ICU ROOM

## 2022-11-02 PROCEDURE — 84443 ASSAY THYROID STIM HORMONE: CPT | Performed by: PHYSICIAN ASSISTANT

## 2022-11-02 RX ORDER — LABETALOL HCL 20 MG/4 ML
10 SYRINGE (ML) INTRAVENOUS EVERY 4 HOURS PRN
Status: DISCONTINUED | OUTPATIENT
Start: 2022-11-02 | End: 2022-11-04 | Stop reason: HOSPADM

## 2022-11-02 RX ORDER — LEVETIRACETAM 250 MG/1
250 TABLET ORAL EVERY 12 HOURS
Status: DISCONTINUED | OUTPATIENT
Start: 2022-11-03 | End: 2022-11-03

## 2022-11-02 RX ORDER — ATORVASTATIN CALCIUM 40 MG/1
40 TABLET, FILM COATED ORAL DAILY
Status: DISCONTINUED | OUTPATIENT
Start: 2022-11-03 | End: 2022-11-04 | Stop reason: HOSPADM

## 2022-11-02 RX ORDER — LEVETIRACETAM 500 MG/5ML
1500 INJECTION, SOLUTION, CONCENTRATE INTRAVENOUS
Status: COMPLETED | OUTPATIENT
Start: 2022-11-02 | End: 2022-11-02

## 2022-11-02 RX ORDER — ACETAMINOPHEN 325 MG/1
650 TABLET ORAL EVERY 6 HOURS PRN
Status: DISCONTINUED | OUTPATIENT
Start: 2022-11-02 | End: 2022-11-04 | Stop reason: HOSPADM

## 2022-11-02 RX ORDER — SODIUM CHLORIDE 0.9 % (FLUSH) 0.9 %
10 SYRINGE (ML) INJECTION
Status: DISCONTINUED | OUTPATIENT
Start: 2022-11-02 | End: 2022-11-04 | Stop reason: HOSPADM

## 2022-11-02 RX ORDER — AMOXICILLIN 250 MG
1 CAPSULE ORAL 2 TIMES DAILY
Status: DISCONTINUED | OUTPATIENT
Start: 2022-11-02 | End: 2022-11-03

## 2022-11-02 RX ORDER — ONDANSETRON 2 MG/ML
4 INJECTION INTRAMUSCULAR; INTRAVENOUS EVERY 8 HOURS PRN
Status: DISCONTINUED | OUTPATIENT
Start: 2022-11-02 | End: 2022-11-04 | Stop reason: HOSPADM

## 2022-11-02 RX ORDER — MIDAZOLAM HYDROCHLORIDE 1 MG/ML
0.5 INJECTION INTRAMUSCULAR; INTRAVENOUS EVERY 5 MIN PRN
Status: COMPLETED | OUTPATIENT
Start: 2022-11-03 | End: 2022-11-03

## 2022-11-02 RX ADMIN — LEVETIRACETAM 1500 MG: 100 INJECTION, SOLUTION INTRAVENOUS at 03:11

## 2022-11-02 NOTE — ED NOTES
Patient identifiers for Anabella Potter 52 y.o. female checked and correct.  Chief Complaint   Patient presents with    transfer     Transfer from [facility] for [facility]; given 2mg ativan total by flight care PTA to ED     Past Medical History:   Diagnosis Date    Anorexia     Trisomy      Allergies reported: Review of patient's allergies indicates:  No Known Allergies      LOC: Patient is NOT awake, alert, and aware of environment with an appropriate affect, Hx MR. Patient is disoriented x 4 and not speaking appropriately, unclear if this is pt's baseline.   APPEARANCE: Patient resting comfortably and in no acute distress. Patient is clean and well groomed, patient's clothing is properly fastened.  HEENT: Pt presents with surgical mask on.   SKIN: The skin is warm and dry. Patient has normal skin turgor and moist mucus membranes. Large hematoma to right eye.    MUSKULOSKELETAL: Patient is moving all extremities well, no obvious deformities noted outside of facial hematoma. Bruising noted to medial left knee area. Pulses intact.   RESPIRATORY: Airway is open and patent. Respirations are spontaneous and non-labored with normal effort and rate.  CARDIAC: Patient has a normal rate and rhythm. 76 on cardiac monitor. No peripheral edema noted.   ABDOMEN: No distention noted. Soft and non-tender upon palpation.  NEUROLOGICAL: pupils 3 mm, PERRL. Facial expression is symmetrical. Hand grasps are equal bilaterally.

## 2022-11-02 NOTE — ED PROVIDER NOTES
"Encounter Date: 11/2/2022       History     Chief Complaint   Patient presents with    transfer     Transfer from [facility] for SAH; given 2mg ativan total by flight care PTA to ED     52-year-old female, history of intellectual disability secondary to Down syndrome, lives in group home in [place], seen at outside hospital today status post what sounds like a seizure episode leading to head trauma.  Patient was evaluated in that department with a CT head and face and found to have a small volume subarachnoid hemorrhage.  She was given Ativan 2 mg IV and Benadryl at the outside facility.  Per her caregivers who were now at bedside patient has no history of seizures but did have a "mini-stroke" last year but has no residual deficits.  Her baseline mental status is she can ambulate, is very interactive though is mostly nonverbal.      IMPRESSION:   ACUTE, SMALL VOLUME, SUBARACHNOID HEMORRHAGE WITHIN THE INFERIOR RIGHT   FRONTAL LOBE, ANTERIOR RIGHT TEMPORAL LOBE, AND TO A LESSER EXTENT   WITHIN THE PARAMEDIAN LEFT FRONTAL LOBE.  TRACE SUBARACHNOID HEMORRHAGE   BETWEEN THE CEREBELLAR FOLIA ON THE RIGHT.   NO HYDROCEPHALUS OR HERNIATION.   MILD TO MODERATE RIGHT PRESEPTAL HEMATOMA WITHOUT CT EVIDENCE OF ACUTE   INTRAORBITAL TRAUMATIC INJURY OR ACUTE MAXILLOFACIAL FRACTURE.   CHRONIC LEFT NASAL BONE DEFECT AND CHRONIC HEALED LEFT ZYGOMATIC ARCH   FRACTURE.   EXTENSIVE BILATERAL MAXILLARY AND MANDIBULAR DENTAL CARIES.   ADVANCED MULTILEVEL CERVICAL DEGENERATIVE DISC DISEASE.        The history is provided by the EMS personnel.   Review of patient's allergies indicates:  No Known Allergies  Past Medical History:   Diagnosis Date    Anorexia     Trisomy      No past surgical history on file.  No family history on file.  Social History     Tobacco Use    Smoking status: Never    Smokeless tobacco: Never   Substance Use Topics    Alcohol use: Never    Drug use: Never     Review of Systems   Unable to perform ROS: " Mental status change     Physical Exam     Initial Vitals [11/02/22 1359]   BP Pulse Resp Temp SpO2   (!) 106/58 70 20 97.5 °F (36.4 °C) 99 %      MAP       --         Physical Exam    Nursing note and vitals reviewed.  Constitutional: She appears well-developed and well-nourished. She appears lethargic.   HENT:   Mouth/Throat: Oropharynx is clear and moist.   Right periorbital ecchymosis and edema   Eyes: Conjunctivae and EOM are normal. Pupils are equal, round, and reactive to light.   Neck: Neck supple.   Cardiovascular:  Normal rate.           Pulmonary/Chest: No respiratory distress.   Abdominal: Abdomen is soft. She exhibits no distension. There is no abdominal tenderness. There is no rebound and no guarding.   Musculoskeletal:      Cervical back: Neck supple.      Comments: Ecchymosis over patient's left thigh  Palpated along patient's upper and lower extremities and does not seem to have bony tenderness     Neurological: She appears lethargic.   Lethargic but arousable to voice  Moves all extremities equally though does not follow commands well   Skin: Skin is warm and dry.   Psychiatric: Cognition and memory are impaired.       ED Course   Procedures  Labs Reviewed   CBC W/ AUTO DIFFERENTIAL - Abnormal; Notable for the following components:       Result Value     (*)     MCH 33.8 (*)     Gran % 78.0 (*)     Lymph % 13.8 (*)     All other components within normal limits   PROTIME-INR   URINALYSIS, REFLEX TO URINE CULTURE   LIPID PANEL   HEMOGLOBIN A1C   TSH   TYPE & SCREEN   POCT GLUCOSE MONITORING CONTINUOUS          Imaging Results               CT Head Without Contrast (Final result)  Result time 11/02/22 19:22:06      Final result by Ravi Lord MD (11/02/22 19:22:06)                   Impression:      Small amount of subarachnoid hemorrhage within right inferior frontal lobe and right anterior temporal lobe.  No midline shift or acute large vascular territory infarct.    Few additional  findings as above.    This report was flagged in Epic as abnormal.    Electronically signed by resident: Saturnino Lindsay  Date:    11/02/2022  Time:    19:07    Electronically signed by: Ravi Lord MD  Date:    11/02/2022  Time:    19:22               Narrative:    EXAMINATION:  CT HEAD WITHOUT CONTRAST    CLINICAL HISTORY:  Subarachnoid hemorrhage (SAH) suspected;Stroke, follow up;f/u [facility];    TECHNIQUE:  Low dose axial CT images obtained throughout the head without the use of intravenous contrast.  Axial, sagittal and coronal reconstructions were performed.    COMPARISON:  None    FINDINGS:  The brain parenchyma demonstrates small amount of subarachnoid hemorrhage within the inferior aspect of the right frontal lobe and anterior aspect of the right temporal lobe.    Mild patchy hypoattenuation in the supratentorial white matter, nonspecific but could reflect chronic microvascular ischemic changes.    No acute major vascular distribution infarct.  No mass effect or midline shift.    Ventricles are prominent in size.  No hydrocephalus.    No extra-axial blood or fluid collections are identified.    The sellar region and craniocervical junction are unremarkable.    Orbits are without significant abnormality.    There is soft tissue prominence overlying the right frontal and supraorbital calvarium extending to the right periorbital region suggesting soft tissue swelling/contusion.  No displaced calvarial fracture.    Mastoid air cells and paranasal sinuses are essentially clear.                                       CT Cervical Spine Without Contrast (Final result)  Result time 11/02/22 16:10:18      Final result by Dea Martin MD (11/02/22 16:10:18)                   Impression:      No acute fractures identified.    Spondylosis of the cervical spine as detailed above, no apparent canal stenosis or foraminal narrowing.    Cerumen impacted right external ear canal      Electronically signed by: Dea  MD Veronica  Date:    11/02/2022  Time:    16:10               Narrative:    EXAMINATION:  CT CERVICAL SPINE WITHOUT CONTRAST    CLINICAL HISTORY:  Neck trauma, midline tenderness (Age 16-64y);    TECHNIQUE:  Low dose axial images, sagittal and coronal reformations were performed though the cervical spine.  Contrast was not administered.    COMPARISON:  None    FINDINGS:  The alignment the cervical is normal.  The vertebral body heights are well maintained severe disc space narrowing at C2-3, C4-5 and C5-6 consistent with degenerative disc disease    No fracture, no osseous lesion seen    Cerumen impacted right external ear canal.  The left external ear canal is not included in the field of view.    The C1 arch is intact.    The atlantooccipital joints are intact.    The bilateral lateral masses of C1-2 are intact.    C2-C3: Minimal posterior disc osteophyte, no canal stenosis or foraminal narrowing.  Mild right facet joint osseous hypertrophy.    C3-C4: No apparent canal stenosis or foraminal narrowing.    C4-C5: No apparent canal stenosis or foraminal narrowing. Chronic appearing air containing (vacuo phenomenon) cystic changes within the C5 vertebrae.    C5-C6: Mild posterior disc osteophyte complex, no canal stenosis or foraminal narrowing.    C6-C7: Mild disc bulge, small right paracentral disc protrusion, no apparent canal stenosis or foraminal narrowing.    C7-T1: Unremarkable    The upper thoracic spine appears normal.  The upper lung zones appear normal.  The visualized paravertebral soft tissues appear normal.                                       Medications   sodium chloride 0.9% flush 10 mL (has no administration in time range)   labetalol 20 mg/4 mL (5 mg/mL) IV syring (has no administration in time range)   senna-docusate 8.6-50 mg per tablet 1 tablet (1 tablet Oral Not Given 11/2/22 2145)   ondansetron injection 4 mg (has no administration in time range)   atorvastatin tablet 40 mg (has no  administration in time range)   levETIRAcetam tablet 250 mg (has no administration in time range)   acetaminophen tablet 650 mg (has no administration in time range)   levETIRAcetam injection 1,500 mg (1,500 mg Intravenous Given 11/2/22 1523)     Medical Decision Making:   History:   Old Medical Records: I decided to obtain old medical records.  Initial Assessment:   1. New onset seizure  -unclear if patient had a seizure because she fell and hit her head with a head bleed versus she fell and hit her head because she was having a seizure?  Seems like it is last likely the latter.  She received Ativan at the outside facility but I have also loaded her with Keppra here.  She is mildly somnolent here and I suspect that is a combination of the Ativan that she got and also being postictal.    2. Subarachnoid hemorrhage, likely traumatic  -neurosurgery consulted  -repeat CT head ordered  -patient had a CT head and face at the outside facility but will add on a CT C-spine given that her mental status is depressed and I can not accurately assess her C-spine  Clinical Tests:   Lab Tests: Ordered and Reviewed  Radiological Study: Ordered and Reviewed  Medical Tests: Reviewed and Ordered  ED Management:  Labs repeated  CT C-spine added  CT head shows a right frontal subarachnoid hemorrhage  Neurosurgery recommends admission to the neuro ICU and neuro ICU team has been consulted for admission.  Neuro status remains stable  Other:   I have discussed this case with another health care provider.       <> Summary of the Discussion: Neuro ICU ALEXUS  Neurosurgery resident          Attending Attestation:         Attending Critical Care:   Critical Care Times:   ==============================================================  Total Critical Care Time - exclusive of procedural time: 35 minutes.  ==============================================================  Critical care was necessary to treat or prevent imminent or life-threatening  deterioration of the following conditions: CNS failure.   The following critical care procedures were done by me (see procedure notes): pulse oximetry.   Critical care was time spent personally by me on the following activities: obtaining history from patient or relative, examination of patient, review of old charts, review of x-rays / CT sent with the patient, ordering lab, x-rays, and/or EKG, development of treatment plan with patient or relative, ordering and performing treatments and interventions, evaluation of patient's response to treatment, discussion with consultants and re-evaluation of patient's conition.   Critical Care Condition: life-threatening                      Clinical Impression:   Final diagnoses:  [R56.9] Seizure  [I60.9] Subarachnoid hemorrhage (Primary)      ED Disposition Condition    Admit Stable                Gabriella Meza MD  11/02/22 7704

## 2022-11-02 NOTE — ED NOTES
Pt sleeping in bed.  Resp even/non-labored, VVS.  Siderails up x 2/call light in reach.  Will continue to monitor.

## 2022-11-03 PROBLEM — E03.9 PRIMARY HYPOTHYROIDISM: Status: ACTIVE | Noted: 2022-11-03

## 2022-11-03 PROBLEM — Q90.9 DOWN SYNDROME: Status: ACTIVE | Noted: 2022-11-03

## 2022-11-03 LAB
ALBUMIN SERPL BCP-MCNC: 3.2 G/DL (ref 3.5–5.2)
ALP SERPL-CCNC: 53 U/L (ref 55–135)
ALT SERPL W/O P-5'-P-CCNC: 38 U/L (ref 10–44)
ANION GAP SERPL CALC-SCNC: 7 MMOL/L (ref 8–16)
ASCENDING AORTA: 2.14 CM
AST SERPL-CCNC: 60 U/L (ref 10–40)
AV INDEX (PROSTH): 0.61
AV MEAN GRADIENT: 2 MMHG
AV PEAK GRADIENT: 2 MMHG
AV VALVE AREA: 1.55 CM2
AV VELOCITY RATIO: 0.76
BASOPHILS # BLD AUTO: 0.06 K/UL (ref 0–0.2)
BASOPHILS NFR BLD: 0.7 % (ref 0–1.9)
BILIRUB SERPL-MCNC: 0.7 MG/DL (ref 0.1–1)
BSA FOR ECHO PROCEDURE: 1.1 M2
BUN SERPL-MCNC: 14 MG/DL (ref 6–20)
CALCIUM SERPL-MCNC: 8.5 MG/DL (ref 8.7–10.5)
CHLORIDE SERPL-SCNC: 102 MMOL/L (ref 95–110)
CO2 SERPL-SCNC: 25 MMOL/L (ref 23–29)
CREAT SERPL-MCNC: 0.7 MG/DL (ref 0.5–1.4)
CV ECHO LV RWT: 0.54 CM
DIFFERENTIAL METHOD: ABNORMAL
DOP CALC AO PEAK VEL: 0.79 M/S
DOP CALC AO VTI: 17.64 CM
DOP CALC LVOT AREA: 2.5 CM2
DOP CALC LVOT DIAMETER: 1.8 CM
DOP CALC LVOT PEAK VEL: 0.6 M/S
DOP CALC LVOT STROKE VOLUME: 27.27 CM3
DOP CALCLVOT PEAK VEL VTI: 10.72 CM
ECHO LV POSTERIOR WALL: 0.94 CM (ref 0.6–1.1)
EJECTION FRACTION: 60 %
EOSINOPHIL # BLD AUTO: 0 K/UL (ref 0–0.5)
EOSINOPHIL NFR BLD: 0 % (ref 0–8)
ERYTHROCYTE [DISTWIDTH] IN BLOOD BY AUTOMATED COUNT: 14 % (ref 11.5–14.5)
EST. GFR  (NO RACE VARIABLE): >60 ML/MIN/1.73 M^2
FRACTIONAL SHORTENING: 43 % (ref 28–44)
GLUCOSE SERPL-MCNC: 114 MG/DL (ref 70–110)
HCT VFR BLD AUTO: 39.4 % (ref 37–48.5)
HGB BLD-MCNC: 12.9 G/DL (ref 12–16)
IMM GRANULOCYTES # BLD AUTO: 0.04 K/UL (ref 0–0.04)
IMM GRANULOCYTES NFR BLD AUTO: 0.5 % (ref 0–0.5)
INTERVENTRICULAR SEPTUM: 0.84 CM (ref 0.6–1.1)
LEFT ATRIUM SIZE: 2.83 CM
LEFT INTERNAL DIMENSION IN SYSTOLE: 2 CM (ref 2.1–4)
LEFT VENTRICLE DIASTOLIC VOLUME INDEX: 22.15 ML/M2
LEFT VENTRICLE DIASTOLIC VOLUME: 24.59 ML
LEFT VENTRICLE MASS INDEX: 79 G/M2
LEFT VENTRICLE SYSTOLIC VOLUME INDEX: 10.9 ML/M2
LEFT VENTRICLE SYSTOLIC VOLUME: 12.05 ML
LEFT VENTRICULAR INTERNAL DIMENSION IN DIASTOLE: 3.5 CM (ref 3.5–6)
LEFT VENTRICULAR MASS: 87.4 G
LYMPHOCYTES # BLD AUTO: 0.8 K/UL (ref 1–4.8)
LYMPHOCYTES NFR BLD: 9.4 % (ref 18–48)
MAGNESIUM SERPL-MCNC: 1.9 MG/DL (ref 1.6–2.6)
MCH RBC QN AUTO: 33.3 PG (ref 27–31)
MCHC RBC AUTO-ENTMCNC: 32.7 G/DL (ref 32–36)
MCV RBC AUTO: 102 FL (ref 82–98)
MONOCYTES # BLD AUTO: 0.6 K/UL (ref 0.3–1)
MONOCYTES NFR BLD: 7.6 % (ref 4–15)
NEUTROPHILS # BLD AUTO: 6.8 K/UL (ref 1.8–7.7)
NEUTROPHILS NFR BLD: 81.8 % (ref 38–73)
NRBC BLD-RTO: 0 /100 WBC
PHOSPHATE SERPL-MCNC: 3.3 MG/DL (ref 2.7–4.5)
PLATELET # BLD AUTO: 223 K/UL (ref 150–450)
PMV BLD AUTO: 9.9 FL (ref 9.2–12.9)
POCT GLUCOSE: 104 MG/DL (ref 70–110)
POCT GLUCOSE: 70 MG/DL (ref 70–110)
POCT GLUCOSE: 94 MG/DL (ref 70–110)
POTASSIUM SERPL-SCNC: 3.5 MMOL/L (ref 3.5–5.1)
PROT SERPL-MCNC: 6.3 G/DL (ref 6–8.4)
RA PRESSURE: 3 MMHG
RBC # BLD AUTO: 3.87 M/UL (ref 4–5.4)
SINUS: 2.01 CM
SODIUM SERPL-SCNC: 134 MMOL/L (ref 136–145)
SODIUM SERPL-SCNC: 141 MMOL/L (ref 136–145)
SODIUM SERPL-SCNC: 145 MMOL/L (ref 136–145)
STJ: 1.85 CM
WBC # BLD AUTO: 8.27 K/UL (ref 3.9–12.7)

## 2022-11-03 PROCEDURE — 99223 1ST HOSP IP/OBS HIGH 75: CPT | Mod: GC,,, | Performed by: NEUROLOGICAL SURGERY

## 2022-11-03 PROCEDURE — 25500020 PHARM REV CODE 255: Performed by: PSYCHIATRY & NEUROLOGY

## 2022-11-03 PROCEDURE — 25000003 PHARM REV CODE 250

## 2022-11-03 PROCEDURE — 25000003 PHARM REV CODE 250: Performed by: PHYSICIAN ASSISTANT

## 2022-11-03 PROCEDURE — 85025 COMPLETE CBC W/AUTO DIFF WBC: CPT | Performed by: PHYSICIAN ASSISTANT

## 2022-11-03 PROCEDURE — 95720 PR EEG, W/VIDEO, CONT RECORD, I&R, >12<26 HRS: ICD-10-PCS | Mod: ,,, | Performed by: PSYCHIATRY & NEUROLOGY

## 2022-11-03 PROCEDURE — 99233 SBSQ HOSP IP/OBS HIGH 50: CPT | Mod: 95,,, | Performed by: PSYCHIATRY & NEUROLOGY

## 2022-11-03 PROCEDURE — 63600175 PHARM REV CODE 636 W HCPCS: Performed by: PHYSICIAN ASSISTANT

## 2022-11-03 PROCEDURE — 83735 ASSAY OF MAGNESIUM: CPT | Performed by: PHYSICIAN ASSISTANT

## 2022-11-03 PROCEDURE — 99233 PR SUBSEQUENT HOSPITAL CARE,LEVL III: ICD-10-PCS | Mod: 95,,, | Performed by: PSYCHIATRY & NEUROLOGY

## 2022-11-03 PROCEDURE — 20000000 HC ICU ROOM

## 2022-11-03 PROCEDURE — 95714 VEEG EA 12-26 HR UNMNTR: CPT

## 2022-11-03 PROCEDURE — 95700 EEG CONT REC W/VID EEG TECH: CPT

## 2022-11-03 PROCEDURE — 99223 PR INITIAL HOSPITAL CARE,LEVL III: ICD-10-PCS | Mod: GC,,, | Performed by: NEUROLOGICAL SURGERY

## 2022-11-03 PROCEDURE — 80053 COMPREHEN METABOLIC PANEL: CPT | Performed by: PHYSICIAN ASSISTANT

## 2022-11-03 PROCEDURE — 99222 1ST HOSP IP/OBS MODERATE 55: CPT | Mod: ,,, | Performed by: INTERNAL MEDICINE

## 2022-11-03 PROCEDURE — 84100 ASSAY OF PHOSPHORUS: CPT | Performed by: PHYSICIAN ASSISTANT

## 2022-11-03 PROCEDURE — 99222 PR INITIAL HOSPITAL CARE,LEVL II: ICD-10-PCS | Mod: ,,, | Performed by: INTERNAL MEDICINE

## 2022-11-03 PROCEDURE — 95720 EEG PHY/QHP EA INCR W/VEEG: CPT | Mod: ,,, | Performed by: PSYCHIATRY & NEUROLOGY

## 2022-11-03 PROCEDURE — 84295 ASSAY OF SERUM SODIUM: CPT

## 2022-11-03 RX ORDER — ASPIRIN 81 MG/1
162 TABLET ORAL DAILY
Status: ON HOLD | COMMUNITY
Start: 2022-10-01 | End: 2022-11-04 | Stop reason: SDUPTHER

## 2022-11-03 RX ORDER — LEVETIRACETAM 250 MG/1
250 TABLET ORAL 2 TIMES DAILY
Status: DISCONTINUED | OUTPATIENT
Start: 2022-11-03 | End: 2022-11-03

## 2022-11-03 RX ORDER — OLANZAPINE 2.5 MG/1
2.5 TABLET ORAL DAILY
Status: DISCONTINUED | OUTPATIENT
Start: 2022-11-03 | End: 2022-11-04 | Stop reason: HOSPADM

## 2022-11-03 RX ORDER — MIDAZOLAM HYDROCHLORIDE 1 MG/ML
2 INJECTION INTRAMUSCULAR; INTRAVENOUS ONCE
Status: COMPLETED | OUTPATIENT
Start: 2022-11-03 | End: 2022-11-04

## 2022-11-03 RX ORDER — LEVETIRACETAM 250 MG/1
250 TABLET ORAL EVERY 12 HOURS
Status: DISCONTINUED | OUTPATIENT
Start: 2022-11-03 | End: 2022-11-04 | Stop reason: HOSPADM

## 2022-11-03 RX ORDER — LEVOTHYROXINE SODIUM 50 UG/1
50 TABLET ORAL
Status: DISCONTINUED | OUTPATIENT
Start: 2022-11-04 | End: 2022-11-04 | Stop reason: HOSPADM

## 2022-11-03 RX ORDER — SODIUM CHLORIDE 9 MG/ML
INJECTION, SOLUTION INTRAVENOUS CONTINUOUS
Status: ACTIVE | OUTPATIENT
Start: 2022-11-03 | End: 2022-11-03

## 2022-11-03 RX ADMIN — ATORVASTATIN CALCIUM 40 MG: 40 TABLET, FILM COATED ORAL at 08:11

## 2022-11-03 RX ADMIN — SODIUM CHLORIDE: 0.9 INJECTION, SOLUTION INTRAVENOUS at 10:11

## 2022-11-03 RX ADMIN — MIDAZOLAM HYDROCHLORIDE 0.5 MG: 1 INJECTION INTRAMUSCULAR; INTRAVENOUS at 01:11

## 2022-11-03 RX ADMIN — LEVETIRACETAM 250 MG: 250 TABLET, FILM COATED ORAL at 08:11

## 2022-11-03 RX ADMIN — OLANZAPINE 2.5 MG: 2.5 TABLET, FILM COATED ORAL at 12:11

## 2022-11-03 RX ADMIN — ACETAMINOPHEN 650 MG: 325 TABLET ORAL at 04:11

## 2022-11-03 RX ADMIN — IOHEXOL 75 ML: 350 INJECTION, SOLUTION INTRAVENOUS at 01:11

## 2022-11-03 RX ADMIN — SODIUM CHLORIDE: 0.9 INJECTION, SOLUTION INTRAVENOUS at 09:11

## 2022-11-03 NOTE — PLAN OF CARE
[facility] Care Plan    POC reviewed with Anabella Potter and family at 1400. Pt verbalized understanding. Questions and concerns addressed. No acute events today. Pt progressing toward goals. Will continue to monitor. See below and flowsheets for full assessment and VS info.     - Agitated  - cEEG in place   - BM x5       Is this a stroke patient? no    Neuro:  Shaun Coma Scale  Best Eye Response: 4-->(E4) spontaneous  Best Motor Response: 5-->(M5) localizes pain  Best Verbal Response: 2-->(V2) incomprehensible speech  Shaun Coma Scale Score: 11  Assessment Qualifiers: patient not sedated/intubated, no eye obstruction present  Pupil PERRLA: yes     24 hr Temp:  [97.6 °F (36.4 °C)-98 °F (36.7 °C)]     CV:   Rhythm: normal sinus rhythm  BP goals:   SBP < 140  MAP > 65    Resp:   O2 Device (Oxygen Therapy): room air       Plan: N/A    GI/:     Diet/Nutrition Received: regular  Last Bowel Movement: 11/03/22  Voiding Characteristics: incontinence    Intake/Output Summary (Last 24 hours) at 11/3/2022 1715  Last data filed at 11/2/2022 2354  Gross per 24 hour   Intake --   Output 1 ml   Net -1 ml     Unmeasured Output  Urine Occurrence: 1  Stool Occurrence: 1  Pad Count: 2    Labs/Accuchecks:  Recent Labs   Lab 11/03/22 0217   WBC 8.27   RBC 3.87*   HGB 12.9   HCT 39.4         Recent Labs   Lab 11/03/22  0217 11/03/22  1203   * 141   K 3.5  --    CO2 25  --      --    BUN 14  --    CREATININE 0.7  --    ALKPHOS 53*  --    ALT 38  --    AST 60*  --    BILITOT 0.7  --       Recent Labs   Lab 11/02/22  1533   INR 1.0    No results for input(s): CPK, CPKMB, TROPONINI, MB in the last 168 hours.    Electrolytes: Electrolytes replaced  Accuchecks: Q6H    Gtts:   sodium chloride 0.9% 75 mL/hr at 11/03/22 1039       LDA/Wounds:  Lines/Drains/Airways       Drain  Duration             Female External Urinary Catheter 11/03/22 0701 <1 day              Peripheral Intravenous Line  Duration                  Peripheral  IV - Single Lumen 11/02/22 2147 22 G Left Hand <1 day         Peripheral IV - Single Lumen 11/03/22 1101 20 G Anterior;Proximal;Right Forearm <1 day                  Wounds: Yes  Wound care consulted: Yes

## 2022-11-03 NOTE — SUBJECTIVE & OBJECTIVE
Interval History:      Review of Systems   Reason unable to perform ROS: Pt with severe intellectual disability.   Constitutional:  Negative for fever.   Respiratory:  Negative for cough.    Gastrointestinal:  Negative for vomiting.   Neurological:  Positive for seizures.     Objective:     Vitals:  Temp: 98.9 °F (37.2 °C)  Pulse: 93  Rhythm: normal sinus rhythm  BP: (!) 123/58  MAP (mmHg): 83  Resp: (!) 29  SpO2: 97 %  O2 Device (Oxygen Therapy): room air    Temp  Min: 97.6 °F (36.4 °C)  Max: 98.9 °F (37.2 °C)  Pulse  Min: 65  Max: 93  BP  Min: 83/51  Max: 136/61  MAP (mmHg)  Min: 63  Max: 85  Resp  Min: 10  Max: 29  SpO2  Min: 95 %  Max: 100 %    11/02 0701 - 11/03 0700  In: -   Out: 1    Unmeasured Output  Urine Occurrence: 1  Stool Occurrence: 1  Pad Count: 1       Physical Exam  Vitals reviewed.   HENT:      Mouth/Throat:      Mouth: Mucous membranes are moist.   Eyes:      Comments: R periorbital haematoma   Cardiovascular:      Rate and Rhythm: Normal rate.   Musculoskeletal:      Cervical back: Neck supple.   Neurological:      Mental Status: She is alert.     Neurological Exam:  MENTAL STATUS  Level of consciousness: alert  Orientation: oriented to person, disoriented to place, and time  Attention reduced. Concentration reduced  Speech: normal    CRANIAL NERVES  CN II: Visual fields full to confrontation  CN III, IV, VI: PERRL, EOMI  CN V: Facial sensation intact  CN VII: Facial expression symmetric and full  CN VIII: Hearing intact to finger rub  CN IX, X: Symmetric palate elevation. Phonation normal  CN XI: Shoulder shrug and head turn intact bilaterally  CN XII: Tongue midline with normal movements, no atrophy    MOTOR EXAM  Muscle bulk: reduced  Muscle tone: difficult to assess as patient resists attempts at passive movement    Strength - Upper Extremities   Arm abduction Elbow flexion Elbow extension Wrist flexion Wrist extension   Right 4/5 4/5 4/5 4/5 4/5   Left 4/5 4/5 4/5 4/5 4/5     Strength -  Lower Extremities   Hip flexion Knee flexion Knee extension Dorsiflexion Plantarflexion   Right 4/5 4/5 4/5 4/5 4/5   Left 4/5 4/5 4/5 4/5 4/5       REFLEXES   Biceps Triceps Brachioradialis Patellar Achilles   Right +2 +2 +2 +1 +2   Left +2 +2 +2 +1 +2     SENSORY EXAM  Light touch: intact in all 4 extremities    COORDINATION  Unable to formally assess 2/2 lack of cooperation, but UE coordination appears to be intact    Medications:  Continuoussodium chloride 0.9%, Last Rate: 75 mL/hr at 11/03/22 1801  Scheduledatorvastatin, 40 mg, Daily  levETIRAcetam, 250 mg, Q12H  [START ON 11/4/2022] levothyroxine, 50 mcg, Before breakfast  OLANZapine, 2.5 mg, Daily  PRNacetaminophen, 650 mg, Q6H PRN  labetalol, 10 mg, Q4H PRN  ondansetron, 4 mg, Q8H PRN  sodium chloride 0.9%, 10 mL, PRN  sodium chloride 0.9% infusion, 500 mL, Once PRN    Today I personally reviewed pertinent medications, lines/drains/airways, imaging, cardiology results, laboratory results, microbiology results, notably:    Diet  Diet Lactose Restricted  Diet Lactose Restricted

## 2022-11-03 NOTE — ASSESSMENT & PLAN NOTE
51 y/o female with Down syndrome with frontal subarachnoid hemorrhage following syncopal fall 11/2/2022 morning.  CTH on arrival to [facility] stable from OSH imaging, consistent with traumatic hemorrhage. CTA stable but extensive assessment limited by motion abnormalities    - Na goals remain Eunatremia per Neuro ICU  - Keppra 250 mg BID  - Neurosurgery following, appreciate recs  - Hold ASA  - SBP goal 100-140   - EEG  - Hourly neuro checks   - PT/OT/SLP

## 2022-11-03 NOTE — NURSING
Patient arrived to [facility] from ED  Report received from: ED RN    Type of stroke/diagnosis:  SAH    Current symptoms: Moving all extremities well, nonverbal at this time, R eye bruised and swollen, agitated and restless, per family verbal but very minimal at baseline    Skin assessment done: Yes  Wounds noted: small non blanchable sacral wound    *If wounds noted, was Wound Care consulted? Yes    Cyr Completed? pending    Patient Belongings on Admit: With caregiver    NCC notified: EDWIN Steward

## 2022-11-03 NOTE — PT/OT/SLP PROGRESS
Speech Language Pathology      Anabella Potter  MRN: [id number]    SLP Evaluation orders received and reviewed with RN.  RN explained Patient tolerating diet. Patient unavailable upon first and second attempts (RN Care) Upon third attempt, RN explained Patient not appropriate for therapy (agitation.)  Upon fourth attempt, RN at bedside for RN care.  ST to re-attempt 11/4/22.      11/3/2022

## 2022-11-03 NOTE — ASSESSMENT & PLAN NOTE
Admitted for SAH as a result of possible seizure-like activity followed by a fall  Management per primary

## 2022-11-03 NOTE — ASSESSMENT & PLAN NOTE
Significant cognitive impairment, lives in group home with caretakers  I discussed plan with them extensively today

## 2022-11-03 NOTE — ASSESSMENT & PLAN NOTE
Newfound hypothyroid labs (TSH 19.8, fT4 slightly low at 0.65).    -- endocrinology consulted, greatly appreciate recs  -- levothyroxine 50 mcg QD  -- per endocrinology, pt clinically euthyroid, symptoms history limited by cognitive impairment  -- TPO Ab ordered by endocrinology, but will not   -- Recommending repeat TSH in 4-6 weeks by PCP

## 2022-11-03 NOTE — H&P
[facility]  Neurocritical Care  History & Physical    Admit Date: 11/2/2022  Service Date: 11/02/2022  Length of Stay: 0    Subjective:     Chief Complaint: SAH (subarachnoid hemorrhage)    History of Present Illness: Ms. Potter is a 53 y/o female with PMH significant for Down Syndrome with severe intellectual delay presents as a transfer for evaluation and management of SAH. Patient lives in an assisted living facility and history provided by caretakers at bedside. Patient was in her normal state of health this morning when she had a witnessed episode of syncope and collapse this morning at approximately 7 AM. She reportedly became acutely contracted in her upper extremities, fell face down onto the ground, and then convulsed for a couple seconds. She was brought to an outside facility where CTH revealed bifrontal SAH. CT face also completed which did not show any fractures. She was [place] [facility] [place].    Patient is on daily ASA 81 mg, no other blood thinners or antiplatelets. She has no h/o seizures or syncope. Her father had a ruptured cerebral aneurysm in the 1980s.     At baseline, patient oriented to self but minimally verbal. She ambulates on her own and feeds herself, requires assistance for some ADLs such as bathing.       Past Medical History:   Diagnosis Date    Anorexia     Trisomy      No past surgical history on file.    No current facility-administered medications on file prior to encounter.     Current Outpatient Medications on File Prior to Encounter   Medication Sig Dispense Refill    acetaminophen (TYLENOL) 160 mg/5 mL Liqd Take 10.2 mLs (326.4 mg total) by mouth every 6 (six) hours as needed (PAIN). 600 mL 1    atorvastatin (LIPITOR) 40 MG tablet Take 1 tablet (40 mg total) by mouth once daily. 90 tablet 1    budesonide (ENTOCORT EC) 3 mg capsule Take 1 capsule by mouth once daily.      calcium carbonate-vitamin D3 500 mg(1,250mg) -400 unit Chew  Take 1 tablet by mouth once daily.      cyanocobalamin (VITAMIN B-12) 250 MCG tablet Take 250 mcg by mouth.      LORazepam (ATIVAN) 0.5 MG tablet Take 0.25 mg by mouth 2 (two) times daily as needed.      montelukast (SINGULAIR) 10 mg tablet Take 1 tablet (10 mg total) by mouth every evening. 10 tablet 0    mupirocin (BACTROBAN) 2 % ointment Apply topically 3 (three) times daily. 30 g 1    omeprazole (PRILOSEC) 40 MG capsule       ondansetron (ZOFRAN-ODT) 4 MG TbDL Take 1 tablet (4 mg total) by mouth every 8 (eight) hours as needed. 30 tablet 0    sulfamethoxazole-trimethoprim 800-160mg (BACTRIM DS) 800-160 mg Tab Take 1 tablet by mouth 2 (two) times daily. 20 tablet 0    VITAMIN B-12 250 MCG tablet Take 250 mcg by mouth every morning.       Allergies: Patient has no known allergies.    No family history on file.    Social History     Tobacco Use    Smoking status: Never    Smokeless tobacco: Never   Substance Use Topics    Alcohol use: Never    Drug use: Never      Review of Systems: Unable to obtain a complete ROS due to level of consciousness.     Vitals:   Temp: 98 °F (36.7 °C)  Pulse: 90  Rhythm: normal sinus rhythm  BP: (!) 114/59  MAP (mmHg): 77  Resp: 18  SpO2: 99 %  O2 Device (Oxygen Therapy): room air    Temp  Min: 97.5 °F (36.4 °C)  Max: 98.6 °F (37 °C)  Pulse  Min: 69  Max: 92  BP  Min: 94/53  Max: 114/59  MAP (mmHg)  Min: 69  Max: 80  Resp  Min: 15  Max: 20  SpO2  Min: 97 %  Max: 100 %    No intake/output data recorded.         Examination:   Constitutional: Downs facies. Small stature. Thin. No apparent distress.   Eyes: R periorbital ecchymosis. Eye opens with aid from examiner, pupil round and briskly reactive, EOMI, no hemorrhage noted subconjunctivally. Conjunctiva clear, anicteric.   Head/Ears/Nose/Mouth/Throat/Neck: Dry mucous membranes. External ears, nose atraumatic.   Cardiovascular: Regular rhythm. No murmurs. No leg edema.  Respiratory: Comfortable respirations. Clear to  auscultation.  Gastrointestinal: No hernia noted (h/o umbilical hernia). Soft, nondistended, nontender. + bowel sounds.    Neurologic:   -E2V2M5  -Drowsy. Nonverbal. Moving purposefully and localizing antigravity in all four extremities.  -Cranial nerves: PERRL, EOMI, facial expression symmetric  -Strength: moves all extremities antigravity with no focal weakness noted.    Unable to test orientation, language, tongue protrusion, coordination, gait due to level of consciousness.    Today I independently reviewed pertinent medications, lines/drains/airways, imaging, laboratory results,     Assessment/Plan:     Neuro  * SAH (subarachnoid hemorrhage)  51 y/o female with Down syndrome with frontal subarachnoid hemorrhage following syncopal fall this morning.   - Admitted to [facility] CT on arrival to C stable from OSH imaging  - Imaging consistent with traumatic hemorrhage, however given syncope and family h/o aneurysm, will obtain CTA at 0100  - Keppra  - Neurosurgery consult  - Hold ASA  - SBP goal 100-140   - EEG  - Eunatremia   - Hourly neuro checks   - PT/OT/SLP     Intellectual delay  Down Syndrome   - Lives in assisted living, caregivers at bedside are medical decision makers.     Other  Syncope and collapse  Syncopal fall with reported convulsion at her living facility  - Loaded with Keppra 1.5g  - EEG  - Echo   - EKG  - Hourly neuro checks           The patient is being Prophylaxed for:  Venous Thromboembolism with: Mechanical  Stress Ulcer with: None  Ventilator Pneumonia with: none    Activity Orders          Turn patient starting at 11/02 2200    Elevate HOB starting at 11/02 2039    Diet NPO: NPO starting at 11/02 2039        Full Code     Critical condition in that Patient has a condition that poses threat to life and bodily function: SAH, syncope      35 minutes of Critical care time was spent personally by me on the following activities: development of treatment plan with patient or surrogate and bedside  caregivers, discussions with consultants, evaluation of patient's response to treatment, examination of patient, ordering and performing treatments and interventions, ordering and review of laboratory studies, ordering and review of radiographic studies, pulse oximetry, antibiotic titration if applicable, vasopressor titration if applicable, re-evaluation of patient's condition. This critical care time did not overlap with that of any other provider or involve time for any procedures. There is high probability for acute neurological change leading to clinical and possibly life-threatening deterioration requiring highest level of physician preparedness for urgent intervention.      Peggy Steward PA-C  Neurocritical Care  [facility]

## 2022-11-03 NOTE — HOSPITAL COURSE
11/03/2022 Patient doing well, at baseline per caregivers. No ICU needs at this time. Na goals remain at 135-145 per neuro ICU.  11/04/2022 Patient remains stable, no epileptiform seizures noted on EEG. Decision was made to discharge with continued keppra (though not explicitly recommended by epilepsy), with plans for outpatient follow up with patient's neurologist Dr. Yap who can make that determination.

## 2022-11-03 NOTE — PLAN OF CARE
[facility] Care Plan    POC reviewed with Anabella Potter and caregivers at 0300. Pt's caregivers verbalized understanding. Questions and concerns addressed. No acute events overnight. Pt progressing toward goals. Will continue to monitor. See below and flowsheets for full assessment and VS info.     No acute neuro changes overnight, patient nonverbal at baseline.  CTA obtained. 1 mg versed administered, VSS.  BM x 2  Patient extremely restless, pulling at lines and cords. Mitten restraints make patient more agitated/flailing. Alerted EDWIN Trivedi of concern in order to obtain EEG, patient is not redirectable. Got the OK to not have EEG.   Per caregivers, patient is back to baseline.         Is this a stroke patient? no    Neuro:  Shaun Coma Scale  Best Eye Response: 4-->(E4) spontaneous  Best Motor Response: 5-->(M5) localizes pain  Best Verbal Response: 2-->(V2) incomprehensible speech  Shaun Coma Scale Score: 11  Assessment Qualifiers: patient not sedated/intubated  Pupil PERRLA: yes     24hr Temp:  [97.5 °F (36.4 °C)-98.6 °F (37 °C)]     CV:   Rhythm: normal sinus rhythm  BP goals:   SBP < 140  MAP > 65    Resp:   O2 Device (Oxygen Therapy): room air       Plan: N/A    GI/:     Diet/Nutrition Received: NPO, mechanical/dental soft  Last Bowel Movement: 11/02/22  Voiding Characteristics: external catheter    Intake/Output Summary (Last 24 hours) at 11/3/2022 0356  Last data filed at 11/2/2022 2354  Gross per 24 hour   Intake --   Output 1 ml   Net -1 ml     Unmeasured Output  Stool Occurrence: 2  Pad Count: 2    Labs/Accuchecks:  Recent Labs   Lab 11/03/22 0217   WBC 8.27   RBC 3.87*   HGB 12.9   HCT 39.4         Recent Labs   Lab 11/03/22  0217   *   K 3.5   CO2 25      BUN 14   CREATININE 0.7   ALKPHOS 53*   ALT 38   AST 60*   BILITOT 0.7      Recent Labs   Lab 11/02/22  1533   INR 1.0    No results for input(s): CPK, CPKMB, TROPONINI, MB in the last 168 hours.    Electrolytes: No replacement  orders  Accuchecks: Q6H    Gtts:      LDA/Wounds:  Lines/Drains/Airways       Peripheral Intravenous Line  Duration                  Peripheral IV - Single Lumen 20 G Anterior;Left Forearm -- days         Peripheral IV - Single Lumen 11/02/22 2147 22 G Left Hand <1 day                  Wounds: Yes  Wound care consulted: Yes

## 2022-11-03 NOTE — HPI
Ms Potter is a 52 y.o. F with Down Syndrome who resides in group home presented to OSH for possible seizure/seizure-like activity followed by ground level fall. No prior hx of seizures. CTH revealed moderate bifrontal SAH which prompted transfer to [facility] for evaluation by NSGY. History obtained from caretakers from group home who are both at beside  today. At baseline pt is ambulatory but requires assistance with ADLs. Speaks some, but mostly nonverbal. While inpatient TFTs checked which revealed high TSH and low fT4.     Endocrinology was consulted for abnormal TFTs.    No known prior hx of hypothyroidism or any thyroid dz. No previous TFTs in [facility] ot [facility]. TFTs not checked in the past per caretakers. They report no significant change in pt's behavior and she has remained at her baseline. No fatigue, constipation, weight gain, cold intolerance, change in mental status. Sleep schedule is unchanged and her energy level at baseline. She has a great appetite and eats well but very thin with no weight gain despite efforts made by caretakers for her to gain weight. Normothermic and normal VS -- baseline BP low-normal. They state pt takes medications crushed and in applesauce and unsure if she would be able or willing to swallow a pill.

## 2022-11-03 NOTE — CONSULTS
[facility] - Neuro Critical Care  Endocrinology  Consult Note    Consult Requested by: Nik Salvador MD   Reason for admit: SAH (subarachnoid hemorrhage)    HISTORY OF PRESENT ILLNESS:  Ms Potter is a 52 y.o. F with Down Syndrome who resides in group home presented to OSH for possible seizure/seizure-like activity followed by ground level fall. No prior hx of seizures. CTH revealed moderate bifrontal SAH which prompted transfer to [facility] for evaluation by NSGY. History obtained from caretakers from group home who are both at beside  today. At baseline pt is ambulatory but requires assistance with ADLs. Speaks some, but mostly nonverbal. While inpatient TFTs checked which revealed high TSH and low fT4.     Endocrinology was consulted for abnormal TFTs.    No known prior hx of hypothyroidism or any thyroid dz. No previous TFTs in [facility] ot [facility]. TFTs not checked in the past per caretakers. They report no significant change in pt's behavior and she has remained at her baseline. No fatigue, constipation, weight gain, cold intolerance, change in mental status. Sleep schedule is unchanged and her energy level at baseline. She has a great appetite and eats well but very thin with no weight gain despite efforts made by caretakers for her to gain weight. Normothermic and normal VS -- baseline BP low-normal. They state pt takes medications crushed and in applesauce and unsure if she would be able or willing to swallow a pill.           Medications and/or Treatments Impacting Glycemic Control:  Immunotherapy:    Immunosuppressants     None        Steroids:   Hormones (From admission, onward)    None        Pressors:    Autonomic Drugs (From admission, onward)    None          Medications Prior to Admission   Medication Sig Dispense Refill Last Dose    acetaminophen (TYLENOL) 160 mg/5 mL Liqd Take 10.2 mLs (326.4 mg total) by mouth every 6 (six) hours as needed (PAIN). 600 mL 1     aspirin (ECOTRIN) 81 MG EC tablet  Take 162 mg by mouth once daily.       atorvastatin (LIPITOR) 40 MG tablet Take 1 tablet (40 mg total) by mouth once daily. 90 tablet 1     budesonide (ENTOCORT EC) 3 mg capsule Take 1 capsule by mouth once daily.       calcium carbonate-vitamin D3 500 mg(1,250mg) -400 unit Chew Take 1 tablet by mouth once daily.       cyanocobalamin (VITAMIN B-12) 250 MCG tablet Take 250 mcg by mouth.       montelukast (SINGULAIR) 10 mg tablet Take 1 tablet (10 mg total) by mouth every evening. 10 tablet 0     mupirocin (BACTROBAN) 2 % ointment Apply topically 3 (three) times daily. 30 g 1     omeprazole (PRILOSEC) 40 MG capsule        ondansetron (ZOFRAN-ODT) 4 MG TbDL Take 1 tablet (4 mg total) by mouth every 8 (eight) hours as needed. 30 tablet 0     VITAMIN B-12 250 MCG tablet Take 250 mcg by mouth every morning.          Current Facility-Administered Medications   Medication Dose Route Frequency Provider Last Rate Last Admin    0.9%  NaCl infusion   Intravenous Continuous Shahzad Quispe MD 75 mL/hr at 11/03/22 1039 New Bag at 11/03/22 1039    acetaminophen tablet 650 mg  650 mg Oral Q6H PRN Peggy Steward PA-C   650 mg at 11/03/22 1612    atorvastatin tablet 40 mg  40 mg Oral Daily Peggy Steward PA-C   40 mg at 11/03/22 0824    labetalol 20 mg/4 mL (5 mg/mL) IV syring  10 mg Intravenous Q4H PRN Peggy Steward PA-C        levETIRAcetam tablet 250 mg  250 mg Oral Q12H Shahzad Quispe MD   250 mg at 11/03/22 0824    [START ON 11/4/2022] levothyroxine tablet 50 mcg  50 mcg Oral Before breakfast Terrie Lee MD        OLANZapine tablet 2.5 mg  2.5 mg Oral Daily Shahzad Quispe MD   2.5 mg at 11/03/22 1235    ondansetron injection 4 mg  4 mg Intravenous Q8H PRN Peggy Steward PA-C        sodium chloride 0.9% flush 10 mL  10 mL Intravenous PRN Peggy Steward PA-C        sodium chloride 0.9% infusion 500 mL  500 mL Intravenous Once PRN Peggy Steward PA-C           Interval HPI:   Overnight  events:  Eatin-50%  Nausea: No  Hypoglycemia and intervention: No  Fever: No  TPN and/or TF: No  If yes, type of TF/TPN and rate: n/a    PMH, PSH, FH, SH updated and reviewed     ROS:  Review of Systems   Unable to perform ROS: Patient nonverbal     Labs Reviewed and Include:  Lab Results   Component Value Date    WBC 8.27 2022    HGB 12.9 2022    HCT 39.4 2022     (H) 2022     2022     Recent Labs   Lab 22  0217 22  1203   *  --    CALCIUM 8.5*  --    ALBUMIN 3.2*  --    PROT 6.3  --    * 141   K 3.5  --    CO2 25  --      --    BUN 14  --    CREATININE 0.7  --    ALKPHOS 53*  --    ALT 38  --    AST 60*  --    BILITOT 0.7  --      Lab Results   Component Value Date    HGBA1C 5.5 2022       Nutritional status:   Body mass index is 16.88 kg/m².  Lab Results   Component Value Date    ALBUMIN 3.2 (L) 2022    ALBUMIN 3.6 2022     No results found for: PREALBUMIN    Estimated Creatinine Clearance: 46.7 mL/min (based on SCr of 0.7 mg/dL).    POCT Glucose results:    Current Insulin Regimen:       PHYSICAL EXAMINATION:  Vitals:    22 1400   BP: (!) 103/51   Pulse: 67   Resp: 14   Temp:      Body mass index is 16.88 kg/m².    Physical Exam  Vitals reviewed.   Constitutional:       Comments: Very thin    HENT:      Mouth/Throat:      Mouth: Mucous membranes are moist.   Eyes:      Comments: R periorbital haematoma   Cardiovascular:      Rate and Rhythm: Normal rate.   Pulmonary:      Effort: Pulmonary effort is normal.   Abdominal:      General: There is no distension.      Palpations: Abdomen is soft.      Tenderness: There is no abdominal tenderness.   Musculoskeletal:      Cervical back: Neck supple.      Right lower leg: No edema.      Left lower leg: No edema.   Skin:     General: Skin is warm and dry.   Neurological:      Mental Status: She is alert. Mental status is at baseline.     .     ASSESSMENT and  PLAN:    * SAH (subarachnoid hemorrhage)  Admitted for SAH as a result of possible seizure-like activity followed by a fall  Management per primary      Down syndrome  Significant cognitive impairment, lives in group home with caretakers  I discussed plan with them extensively today      Primary hypothyroidism  TFTs on admission c/w primary hypothyroidism, TSH  19.8 and fT4 slightly low at 0.65  Clinically euthyroid - although symptoms somewhat difficult to assess given cognitive impairment, but caretakers at group home are able to answer most questions  No concerning clinical signs or symptoms of myxedema coma. Pt is at her mental status baseline now per caretakers.  Discussed diagnosis with caretakers - most common etiology is Hashimoto's. I ordered a TPO Ab to be drawn with other AM labs tomorrow. This will take several days to result however and does not   Recommend starting wt based levothyroxine which is 50 mcg daily   Repeat TSH in 4-6wks to assess adedquacy of dose -- this can be checked by PCP    Of note she takes meds crushed in apple sauce - I discussed with caretakers that this may decrease LT4 absorption a little, however since she will be consistently taking meds the same way daily, she might end up requiring a dose above dose predicted by wt, which is ok as long as there is consistency in how she is taking it. Additionally, it pt agreeable to take medication this way, would rather know she is taking it regularly rather than pt refusing it occasionally and missing doses.        Endocrine will sign off. Please call with any additional questions.      Terrie Lee MD  Endocrinology  [facility]

## 2022-11-03 NOTE — PLAN OF CARE
[facility] Critical [facility]  Initial Discharge Assessment       Primary Care Provider: Paulino Edmonds MD    Admission Diagnosis: Subarachnoid hemorrhage [I60.9]  Syncope and collapse [R55]  Seizure [R56.9]  Syncope [R55]    Admission Date: 11/2/2022  Expected Discharge Date:     Discharge Barriers Identified: None    Payor: MEDICARE / Plan: MEDICARE PART A & B / Product Type: Government /     Extended Emergency Contact Information  Primary Emergency Contact: Karin Matamoros  Mobile Phone: [phone]  Relation: Other  Preferred language: English   needed? No    Discharge Plan A: Group Home  Discharge Plan B: Group home      [facility] [place] - [address]  [address]  [place] [address]  Phone: [phone] Fax: [phone]    [id number] - [place], [place] - [address]  [address]  [place] [address]  Phone: [phone] Fax: [phone]    [facility] - [place], [place] - [address]  [address]  [address]  [place] [address]  Phone: [phone] Fax: [phone]      Initial Assessment (most recent)       Adult Discharge Assessment - 11/03/22 0924          Discharge Assessment    Assessment Type Discharge Planning Assessment     Confirmed/corrected address, phone number and insurance Yes     Confirmed Demographics Correct on Facesheet     Source of Information health care advocate;other (see comments)   Group Home Staff; (Cece Szymanski) Mckayla Matamoros -[phone] and Shirley Jeansonne- [phone]    Communicated PAMELA with patient/caregiver Date not available/Unable to determine     Reason For Admission SAH (subarachnoid hemorrhage)     Lives With other (see comments)   Group Home Staff; (Cece Szymanski) Mckayla Matamoros -[phone] and Shirley Jeansonne- [phone]    Facility Arrived From: [facility]     Do you expect to return to your current living situation? Yes     Do you have help at home or someone to  help you manage your care at home? Yes     Who are your caregiver(s) and their phone number(s)? Group Home Staff; (Cece Szymanski) - Karin [phone] and Shirley Jeansonne- [phone]     Prior to hospitilization cognitive status: Alert/Oriented     Current cognitive status: Alert/Oriented     Walking or Climbing Stairs Difficulty none     Dressing/Bathing Difficulty bathing difficulty, assistance 1 person     Dressing/Bathing Management Bathing difficulty, assistance 1 person     Do you have any problems with: --   Group Home Staff; (Cece Szymanski) - Karin [phone] and Shirley Jeansonne- [phone]    Home Accessibility wheelchair accessible     Home Layout Able to live on 1st floor     Equipment Currently Used at Home none     Readmission within 30 days? No     Patient currently being followed by outpatient case management? No     Do you currently have service(s) that help you manage your care at home? No     Do you take prescription medications? Yes     Do you have prescription coverage? Yes     Coverage Medicare- Part A & B, [place] Medicaid     Do you have any problems affording any of your prescribed medications? No     Is the patient taking medications as prescribed? yes     Who is going to help you get home at discharge? Group Home Staff; (Cece Szymanski) - Karin [phone] and Shirley Jeansonne- [phone]     How do you get to doctors appointments? other (see comments)   Group Home Staff; (Cece Szymanski) - Karin [phone] and Shirley Jeansonne- [phone]    Are you on dialysis? No     Do you take coumadin? No     Discharge Plan A Group Home     Discharge Plan B Group home     DME Needed Upon Discharge  other (see comments)   TBD    Discharge Plan discussed with: Caregiver     Name(s) and Number(s) Group Home Staff; (Cece Szymanski) - Karin [phone] and Shirley Jeansonne- [phone]     Discharge Barriers Identified None                    NEENA spoke with patient's Group Home Staff; (Cece Szymanski) - Karin Shiloh -[phone] and Shirley Jeansonne- [phone] at bedside in 9089 for Discharge Planning Assessment. Per Group Home staff, Patient lives in a group home at [facility]  on a slab foundation with zero steps to porch and point of entry.  Patient was dependent with ADLS and DID NOT use DME or in-home assistive equipment. Patient is not on dialysis  or coumadin, takes medications as prescribed / keeps refilled / has resources for all daily and prescriptive needs. Preferred pharmacy is [facility]  - Agreeable to bedside delivery / Wants any necessary medication sent to preferred pharmacy.   Will have help from Group Home Staff; (Cece Szymanski) - Karin Shiloh -[phone] and Shirley Jeansonne- [phone]  and other immediate family upon discharge - Group Home Staff to provide transportation home.  All questions addressed. Unit and CM direct numbers provided. Will continue to follow for course of hospitalization    11/2/2022  2:19 PM  Subarachnoid hemorrhage [I60.9]  Syncope and collapse [R55]  Seizure [R56.9]  Syncope [R55]    PCP: Paulino Edmonds MD    PHARMACY:   [facility] - [place] - [address]  [address] [facility] [address]  [place] [address]  Phone: [phone] Fax: [phone]    [id number] [place] - [address]  [address]  [place] [address]  Phone: [phone] Fax: [phone]    [facility] - [place], [place] - [address]  [address]  [address]  [place] [address]  Phone: [phone] Fax: [phone]      Payor: MEDICARE / Plan: MEDICARE PART A & B / Product Type: Government /       Devi Isidro LMSW  PRN - [facility]  [id number]

## 2022-11-03 NOTE — ASSESSMENT & PLAN NOTE
Syncopal fall with reported convulsion at her living facility  - Loaded with Keppra 1.5g  - EEG  - Echo   - EKG  - Hourly neuro checks

## 2022-11-03 NOTE — SUBJECTIVE & OBJECTIVE
Interval HPI:   Overnight events:  Eatin-50%  Nausea: No  Hypoglycemia and intervention: No  Fever: No  TPN and/or TF: No  If yes, type of TF/TPN and rate: n/a    PMH, PSH, FH, SH updated and reviewed     ROS:  Review of Systems   Unable to perform ROS: Patient nonverbal     Labs Reviewed and Include:  Lab Results   Component Value Date    WBC 8.27 2022    HGB 12.9 2022    HCT 39.4 2022     (H) 2022     2022     Recent Labs   Lab 22  0217 22  1203   *  --    CALCIUM 8.5*  --    ALBUMIN 3.2*  --    PROT 6.3  --    * 141   K 3.5  --    CO2 25  --      --    BUN 14  --    CREATININE 0.7  --    ALKPHOS 53*  --    ALT 38  --    AST 60*  --    BILITOT 0.7  --      Lab Results   Component Value Date    HGBA1C 5.5 2022       Nutritional status:   Body mass index is 16.88 kg/m².  Lab Results   Component Value Date    ALBUMIN 3.2 (L) 2022    ALBUMIN 3.6 2022     No results found for: PREALBUMIN    Estimated Creatinine Clearance: 46.7 mL/min (based on SCr of 0.7 mg/dL).    POCT Glucose results:    Current Insulin Regimen:       PHYSICAL EXAMINATION:  Vitals:    22 1400   BP: (!) 103/51   Pulse: 67   Resp: 14   Temp:      Body mass index is 16.88 kg/m².    Physical Exam  Vitals reviewed.   Constitutional:       Comments: Very thin    HENT:      Mouth/Throat:      Mouth: Mucous membranes are moist.   Eyes:      Comments: R periorbital haematoma   Cardiovascular:      Rate and Rhythm: Normal rate.   Pulmonary:      Effort: Pulmonary effort is normal.   Abdominal:      General: There is no distension.      Palpations: Abdomen is soft.      Tenderness: There is no abdominal tenderness.   Musculoskeletal:      Cervical back: Neck supple.      Right lower leg: No edema.      Left lower leg: No edema.   Skin:     General: Skin is warm and dry.   Neurological:      Mental Status: She is alert. Mental status is at baseline.

## 2022-11-03 NOTE — PT/OT/SLP PROGRESS
Occupational Therapy      Patient Name:  Anabella Potter   MRN:  [id number]    Patient not seen today secondary to Patient unwilling to participate (10 minutes with patient she was resistive to movement and interaction. Remains an eval.). Will follow-up 11/04.    11/3/2022

## 2022-11-03 NOTE — ASSESSMENT & PLAN NOTE
TFTs on admission c/w primary hypothyroidism, TSH  19.8 and fT4 slightly low at 0.65  Clinically euthyroid - although symptoms somewhat difficult to assess given cognitive impairment, but caretakers at group home are able to answer most questions  No concerning clinical signs or symptoms of myxedema coma. Pt is at her mental status baseline now per caretakers.  Discussed diagnosis with caretakers - most common etiology is Hashimoto's. I ordered a TPO Ab to be drawn with other AM labs tomorrow. This will take several days to result however and does not   Recommend starting wt based levothyroxine which is 50 mcg daily   Repeat TSH in 4-6wks to assess adedquacy of dose -- this can be checked by PCP    Of note she takes meds crushed in apple sauce - I discussed with caretakers that this may decrease LT4 absorption a little, however since she will be consistently taking meds the same way daily, she might end up requiring a dose above dose predicted by wt, which is ok as long as there is consistency in how she is taking it. Additionally, it pt agreeable to take medication this way, would rather know she is taking it regularly rather than pt refusing it occasionally and missing doses.

## 2022-11-03 NOTE — PROGRESS NOTES
[facility]  Neurosurgery  Progress Note    Subjective:     History of Present Illness: 52F h/o questionable prior TIA without residual deficits, intellectual disability secondary to Down syndrome requiring daily assistance with ADLs, presents as transfer from OSH for possible seizure event and ground level fall. Per witnesses, she was walking and experienced generalized clonus, dropped to the ground and began convulsing. At OSH she was administered Ativan. At baseline she is verbal and oriented to self but not to year. CTH from OSH reviewed; there is moderate bifrontal SAH with probably frontal contusions. There are no obvious skull fractures.       Post-Op Info:  * No surgery found *         Interval History:   11/3: NAEON. On EEG. Na 134. AF, VSS.     Medications:  Continuous Infusions:   sodium chloride 0.9% 75 mL/hr at 11/03/22 1039     Scheduled Meds:   atorvastatin  40 mg Oral Daily    levETIRAcetam  250 mg Oral Q12H    senna-docusate 8.6-50 mg  1 tablet Oral BID     PRN Meds:acetaminophen, labetalol, ondansetron, sodium chloride 0.9%, sodium chloride 0.9% infusion     Review of Systems  Objective:     Weight: 31.8 kg (70 lb)  Body mass index is 16.88 kg/m².  Vital Signs (Most Recent):  Temp: 97.9 °F (36.6 °C) (11/03/22 0706)  Pulse: 70 (11/03/22 0900)  Resp: 19 (11/03/22 0900)  BP: 136/61 (11/03/22 0900)  SpO2: 99 % (11/03/22 0900) Vital Signs (24h Range):  Temp:  [97.5 °F (36.4 °C)-98.6 °F (37 °C)] 97.9 °F (36.6 °C)  Pulse:  [66-92] 70  Resp:  [10-24] 19  SpO2:  [95 %-100 %] 99 %  BP: ()/(51-68) 136/61                     Female External Urinary Catheter 11/03/22 0701 (Active)       Physical Exam    Neurosurgery Physical Exam  [id number]  Awake  Contracted BUE w/d to stimulation to midline  PERRL  Grimaces to pain  LACKEY spontaneously antigravity    Gen: well nourished, normocephalic, atraumatic  CV: skin warm and dry, distal pulses present  Pulm: chest rise symmetric, no increased  work of breathing  GI: abdomen soft, non-distended, non-tender  : patient voiding independently  MSK: no bony abnormalities noted  Psych: patient interacting with appropriate mood and affect         Significant Labs:  Recent Labs   Lab 11/02/22  0947 11/03/22 0217   GLU  --  114*    134*   K 4.1 3.5    102   CO2 31 25   BUN 22* 14   CREATININE 0.86 0.7   CALCIUM 8.6 8.5*   MG  --  1.9     Recent Labs   Lab 11/02/22  0947 11/02/22  1533 11/03/22 0217   WBC 7.8 6.90 8.27   HGB 14.0 13.9 12.9   HCT 42.2 41.8 39.4    261 223     Recent Labs   Lab 11/02/22  1533   INR 1.0     Microbiology Results (last 7 days)       ** No results found for the last 168 hours. **          All pertinent labs from the last 24 hours have been reviewed.    Significant Diagnostics:  I have reviewed all pertinent imaging results/findings within the past 24 hours.    Assessment/Plan:     * SAH (subarachnoid hemorrhage)  52F h/o questionable prior TIA without residual deficits, intellectual disability secondary to Down syndrome requiring daily assistance with ADLs, presents as transfer from OSH for possible seizure event and ground level fall. CTH from OSH reviewed; there is moderate bifrontal SAH with probably frontal contusions. There are no obvious skull fractures. Her presentation is consistent with non-aneurysmal SAH.     --Admit to [facility] with q1h neurochecks.   --All labs and diagnostics reviewed.   --Obtain CTA at 6h for interval stability: Negative for vascular anomaly.    --SBP < 140  --Keppra 500 BID  --cvEEG today  --obtain CT head tomorrow.   --CT C Spine negative for acute fracture  --MRI Brain may obtain outpatient  --Na 140-150  --Please page with exam change or questions.     Plan d/w Dr. Frey.     Dispo: Admit to ICU.         Jeanmarie Callaway MD  Neurosurgery  [facility]

## 2022-11-03 NOTE — HPI
Ms. Potter is a 51 y/o female with PMH significant for Down Syndrome with severe intellectual delay presents as a transfer for evaluation and management of SAH. Patient lives in an assisted living facility and history provided by caretakers at bedside. Patient was in her normal state of health this morning when she had a witnessed episode of syncope and collapse this morning at approximately 7 AM. She reportedly became acutely contracted in her upper extremities, fell face down onto the ground, and then convulsed for a couple seconds. She was brought to an outside facility where CTH revealed bifrontal SAH. CT face also completed which did not show any fractures. She was transferred to [facility] [place] management.    Patient is on daily ASA 81 mg, no other blood thinners or antiplatelets. She has no h/o seizures or syncope. Her father had a ruptured cerebral aneurysm in the 1980s.     At baseline, patient oriented to self but minimally verbal. She ambulates on her own and feeds herself, requires assistance for some ADLs such as bathing.

## 2022-11-03 NOTE — SUBJECTIVE & OBJECTIVE
(Not in a hospital admission)      Review of patient's allergies indicates:  No Known Allergies    Past Medical History:   Diagnosis Date    Anorexia     Trisomy      No past surgical history on file.  Family History    None       Tobacco Use    Smoking status: Never    Smokeless tobacco: Never   Substance and Sexual Activity    Alcohol use: Never    Drug use: Never    Sexual activity: Never     Review of Systems   Unable to perform ROS: Mental status change   Objective:     Weight: 31.8 kg (70 lb)  Body mass index is 16.88 kg/m².  Vital Signs (Most Recent):  Temp: 97.7 °F (36.5 °C) (11/02/22 1435)  Pulse: 73 (11/02/22 1803)  Resp: 20 (11/02/22 1540)  BP: (!) 98/59 (11/02/22 1803)  SpO2: 99 % (11/02/22 1803)   Vital Signs (24h Range):  Temp:  [97.5 °F (36.4 °C)-98.6 °F (37 °C)] 97.7 °F (36.5 °C)  Pulse:  [70-92] 73  Resp:  [18-20] 20  SpO2:  [98 %-100 %] 99 %  BP: ()/(51-60) 98/59                          Physical Exam    Neurosurgery Physical Exam    GENERAL: restless; pulling at lines  HEENT: L periorbital ecchymosis, PERRL, mucous membranes moist  NECK: supple, trachea midline  CV: normal capillary refill  PULM: aerating well, symmetric expansion, no distress  ABD: soft, NT, ND  EXT: no c/c/e    NEURO:    E2V2M5  Grunts and grimaces to noxious stimuli  PERRL  LACKEY spontaneous antigravity        Significant Labs:  Recent Labs   Lab 11/02/22  0947      K 4.1      CO2 31   BUN 22*   CREATININE 0.86   CALCIUM 8.6     Recent Labs   Lab 11/02/22  0947 11/02/22  1533   WBC 7.8 6.90   HGB 14.0 13.9   HCT 42.2 41.8    261     Recent Labs   Lab 11/02/22  1533   INR 1.0     Microbiology Results (last 7 days)       ** No results found for the last 168 hours. **          All pertinent labs from the last 24 hours have been reviewed.    Significant Diagnostics:  I have reviewed all pertinent imaging results/findings within the past 24 hours.  CT Head Without Contrast    Result Date: 11/2/2022  Small  amount of subarachnoid hemorrhage within right inferior frontal lobe and right anterior temporal lobe.  No midline shift or acute large vascular territory infarct. Few additional findings as above. This report was flagged in Epic as abnormal. Electronically signed by resident: Saturnino Lindsay Date:    11/02/2022 Time:    19:07 Electronically signed by: Ravi Lord MD Date:    11/02/2022 Time:    19:22    CT Cervical Spine Without Contrast    Result Date: 11/2/2022  No acute fractures identified. Spondylosis of the cervical spine as detailed above, no apparent canal stenosis or foraminal narrowing. Cerumen impacted right external ear canal Electronically signed by: Dea Martin MD Date:    11/02/2022 Time:    16:10

## 2022-11-03 NOTE — ASSESSMENT & PLAN NOTE
52F h/o questionable prior TIA without residual deficits, intellectual disability secondary to Down syndrome requiring daily assistance with ADLs, presents as transfer from OSH for possible seizure event and ground level fall. CTH from OSH reviewed; there is moderate bifrontal SAH with probably frontal contusions. There are no obvious skull fractures. Her presentation is consistent with non-aneurysmal SAH.     --Admit to [facility] with q1h neurochecks.   --All labs and diagnostics reviewed.   --Obtain CTA at 6h for interval stability and to assess for underlying vascular lesions.   --SBP < 140  --Keppra 500 BID  --Na>135  --NPO  --Please page with exam change or questions.     Plan d/w Dr. Frey.     Dispo: Admit to ICU.

## 2022-11-03 NOTE — CONSULTS
Inpatient consult to Physical Medicine Rehab  Consult performed by: Malka Dalton NP  Consult ordered by: Peggy Steward PA-C    Consult received.  Reviewed patient history and current admission.  PM&R following. Will follow up with pt once medically stable and able to participate with therapies.     Malka Dalton NP  Physical Medicine & Rehabilitation   11/03/2022

## 2022-11-03 NOTE — PT/OT/SLP PROGRESS
Physical Therapy      Patient Name:  Anabella Potter   MRN:  [id number]    Patient not seen today secondary to pt unwilling to participate. Writing therapist spent ~10 mins with pt, however she was resistive regarding mobility.  Will follow-up tomorrow, 11/4.

## 2022-11-03 NOTE — SUBJECTIVE & OBJECTIVE
Interval History:   11/3: NAEON. On EEG. Na 134. AF, VSS.     Medications:  Continuous Infusions:   sodium chloride 0.9% 75 mL/hr at 11/03/22 1039     Scheduled Meds:   atorvastatin  40 mg Oral Daily    levETIRAcetam  250 mg Oral Q12H    senna-docusate 8.6-50 mg  1 tablet Oral BID     PRN Meds:acetaminophen, labetalol, ondansetron, sodium chloride 0.9%, sodium chloride 0.9% infusion     Review of Systems  Objective:     Weight: 31.8 kg (70 lb)  Body mass index is 16.88 kg/m².  Vital Signs (Most Recent):  Temp: 97.9 °F (36.6 °C) (11/03/22 0706)  Pulse: 70 (11/03/22 0900)  Resp: 19 (11/03/22 0900)  BP: 136/61 (11/03/22 0900)  SpO2: 99 % (11/03/22 0900) Vital Signs (24h Range):  Temp:  [97.5 °F (36.4 °C)-98.6 °F (37 °C)] 97.9 °F (36.6 °C)  Pulse:  [66-92] 70  Resp:  [10-24] 19  SpO2:  [95 %-100 %] 99 %  BP: ()/(51-68) 136/61                     Female External Urinary Catheter 11/03/22 0701 (Active)       Physical Exam    Neurosurgery Physical Exam  E4v2M5  Awake  Contracted BUE w/d to stimulation to midline  PERRL  Grimaces to pain  LACKEY spontaneously antigravity    Gen: well nourished, normocephalic, atraumatic  CV: skin warm and dry, distal pulses present  Pulm: chest rise symmetric, no increased work of breathing  GI: abdomen soft, non-distended, non-tender  : patient voiding independently  MSK: no bony abnormalities noted  Psych: patient interacting with appropriate mood and affect         Significant Labs:  Recent Labs   Lab 11/02/22  0947 11/03/22 0217   GLU  --  114*    134*   K 4.1 3.5    102   CO2 31 25   BUN 22* 14   CREATININE 0.86 0.7   CALCIUM 8.6 8.5*   MG  --  1.9     Recent Labs   Lab 11/02/22  0947 11/02/22  1533 11/03/22  0217   WBC 7.8 6.90 8.27   HGB 14.0 13.9 12.9   HCT 42.2 41.8 39.4    261 223     Recent Labs   Lab 11/02/22  1533   INR 1.0     Microbiology Results (last 7 days)       ** No results found for the last 168 hours. **          All pertinent labs from the  last 24 hours have been reviewed.    Significant Diagnostics:  I have reviewed all pertinent imaging results/findings within the past 24 hours.

## 2022-11-03 NOTE — HPI
52F h/o questionable prior TIA without residual deficits, intellectual disability secondary to Down syndrome requiring daily assistance with ADLs, presents as transfer from OSH for possible seizure event and ground level fall. Per witnesses, she was walking and experienced generalized clonus, dropped to the ground and began convulsing. At OSH she was administered Ativan. At baseline she is verbal and oriented to self but not to year. CTH from OSH reviewed; there is moderate bifrontal SAH with probably frontal contusions. There are no obvious skull fractures.

## 2022-11-03 NOTE — PROGRESS NOTES
[facility]  Neurocritical Care  Progress Note    Admit Date: 11/2/2022  Service Date: 11/03/2022  Length of Stay: 1    Subjective:     Chief Complaint: SAH (subarachnoid hemorrhage)    History of Present Illness: Ms. Potter is a 53 y/o female with PMH significant for Down Syndrome with severe intellectual delay presents as a transfer for evaluation and management of SAH. Patient lives in an assisted living facility and history provided by caretakers at bedside. Patient was in her normal state of health this morning when she had a witnessed episode of syncope and collapse this morning at approximately 7 AM. She reportedly became acutely contracted in her upper extremities, fell face down onto the ground, and then convulsed for a couple seconds. She was brought to an outside facility where CTH revealed bifrontal SAH. CT face also completed which did not show any fractures. She was transferred to [facility] [place] management.    Patient is on daily ASA 81 mg, no other blood thinners or antiplatelets. She has no h/o seizures or syncope. Her father had a ruptured cerebral aneurysm in the 1980s.     At baseline, patient oriented to self but minimally verbal. She ambulates on her own and feeds herself, requires assistance for some ADLs such as bathing.       Hospital Course: 11/03/2022 Patient doing well, at baseline per caregivers. No ICU needs at this time. Na goals remain at 135-145 per neuro ICU.      Interval History:      Review of Systems   Reason unable to perform ROS: Pt with severe intellectual disability.   Constitutional:  Negative for fever.   Respiratory:  Negative for cough.    Gastrointestinal:  Negative for vomiting.   Neurological:  Positive for seizures.     Objective:     Vitals:  Temp: 98.9 °F (37.2 °C)  Pulse: 93  Rhythm: normal sinus rhythm  BP: (!) 123/58  MAP (mmHg): 83  Resp: (!) 29  SpO2: 97 %  O2 Device (Oxygen Therapy): room air    Temp  Min: 97.6 °F (36.4 °C)  Max: 98.9 °F  (37.2 °C)  Pulse  Min: 65  Max: 93  BP  Min: 83/51  Max: 136/61  MAP (mmHg)  Min: 63  Max: 85  Resp  Min: 10  Max: 29  SpO2  Min: 95 %  Max: 100 %    11/02 0701 - 11/03 0700  In: -   Out: 1    Unmeasured Output  Urine Occurrence: 1  Stool Occurrence: 1  Pad Count: 1       Physical Exam  Vitals reviewed.   HENT:      Mouth/Throat:      Mouth: Mucous membranes are moist.   Eyes:      Comments: R periorbital haematoma   Cardiovascular:      Rate and Rhythm: Normal rate.   Musculoskeletal:      Cervical back: Neck supple.   Neurological:      Mental Status: She is alert.     Neurological Exam:  MENTAL STATUS  Level of consciousness: alert  Orientation: oriented to person, disoriented to place, and time  Attention reduced. Concentration reduced  Speech: normal    CRANIAL NERVES  CN II: Visual fields full to confrontation  CN III, IV, VI: PERRL, EOMI  CN V: Facial sensation intact  CN VII: Facial expression symmetric and full  CN VIII: Hearing intact to finger rub  CN IX, X: Symmetric palate elevation. Phonation normal  CN XI: Shoulder shrug and head turn intact bilaterally  CN XII: Tongue midline with normal movements, no atrophy    MOTOR EXAM  Muscle bulk: reduced  Muscle tone: difficult to assess as patient resists attempts at passive movement    Strength - Upper Extremities   Arm abduction Elbow flexion Elbow extension Wrist flexion Wrist extension   Right 4/5 4/5 4/5 4/5 4/5   Left 4/5 4/5 4/5 4/5 4/5     Strength - Lower Extremities   Hip flexion Knee flexion Knee extension Dorsiflexion Plantarflexion   Right 4/5 4/5 4/5 4/5 4/5   Left 4/5 4/5 4/5 4/5 4/5       REFLEXES   Biceps Triceps Brachioradialis Patellar Achilles   Right +2 +2 +2 +1 +2   Left +2 +2 +2 +1 +2     SENSORY EXAM  Light touch: intact in all 4 extremities    COORDINATION  Unable to formally assess 2/2 lack of cooperation, but UE coordination appears to be intact    Medications:  Continuoussodium chloride 0.9%, Last Rate: 75 mL/hr at 11/03/22  1801  Scheduledatorvastatin, 40 mg, Daily  levETIRAcetam, 250 mg, Q12H  [START ON 11/4/2022] levothyroxine, 50 mcg, Before breakfast  OLANZapine, 2.5 mg, Daily  PRNacetaminophen, 650 mg, Q6H PRN  labetalol, 10 mg, Q4H PRN  ondansetron, 4 mg, Q8H PRN  sodium chloride 0.9%, 10 mL, PRN  sodium chloride 0.9% infusion, 500 mL, Once PRN    Today I personally reviewed pertinent medications, lines/drains/airways, imaging, cardiology results, laboratory results, microbiology results, notably:    Diet  Diet Lactose Restricted  Diet Lactose Restricted        Assessment/Plan:     Neuro  * SAH (subarachnoid hemorrhage)  53 y/o female with Down syndrome with frontal subarachnoid hemorrhage following syncopal fall 11/2/2022 morning.  CTH on arrival to C stable from OSH imaging, consistent with traumatic hemorrhage. CTA stable but extensive assessment limited by motion abnormalities    - Na goals remain Eunatremia per Neuro ICU  - Keppra 250 mg BID  - Neurosurgery following, appreciate recs  - Hold ASA  - SBP goal 100-140   - EEG  - Hourly neuro checks   - PT/OT/SLP     Intellectual delay  Down Syndrome   - Lives in assisted living, caregivers at bedside are medical decision makers.     Endocrine  Primary hypothyroidism  Newfound hypothyroid labs (TSH 19.8, fT4 slightly low at 0.65).    -- endocrinology consulted, greatly appreciate recs  -- levothyroxine 50 mcg QD  -- per endocrinology, pt clinically euthyroid, symptoms history limited by cognitive impairment  -- TPO Ab ordered by endocrinology, but will not   -- Recommending repeat TSH in 4-6 weeks by PCP    Genetic  Down syndrome  Cognitive impairment, lives in group home          The patient is being Prophylaxed for:  Venous Thromboembolism with: None  Stress Ulcer with: None  Ventilator Pneumonia with: none    Activity Orders          Diet Lactose Restricted: Lactose Restricted starting at 11/03 0640    Turn patient starting at 11/02 2200    Elevate HOB  starting at 11/02 2039        Full Code    Shahzad Quispe MD  Neurocritical Care  [facility] - Neuro Critical Care

## 2022-11-03 NOTE — CONSULTS
[facility]  Neurosurgery  Consult Note    Inpatient consult to Neurosurgery  Consult performed by: Avtar Carrion MD  Consult ordered by: Gabriella Meza MD      Subjective:     Chief Complaint/Reason for Admission: SAH    History of Present Illness: 52F h/o questionable prior TIA without residual deficits, intellectual disability secondary to Down syndrome requiring daily assistance with ADLs, presents as transfer from OSH for possible seizure event and ground level fall. Per witnesses, she was walking and experienced generalized clonus, dropped to the ground and began convulsing. At OSH she was administered Ativan. At baseline she is verbal and oriented to self but not to year. CTH from OSH reviewed; there is moderate bifrontal SAH with probably frontal contusions. There are no obvious skull fractures.       (Not in a hospital admission)      Review of patient's allergies indicates:  No Known Allergies    Past Medical History:   Diagnosis Date    Anorexia     Trisomy      No past surgical history on file.  Family History    None       Tobacco Use    Smoking status: Never    Smokeless tobacco: Never   Substance and Sexual Activity    Alcohol use: Never    Drug use: Never    Sexual activity: Never     Review of Systems   Unable to perform ROS: Mental status change   Objective:     Weight: 31.8 kg (70 lb)  Body mass index is 16.88 kg/m².  Vital Signs (Most Recent):  Temp: 97.7 °F (36.5 °C) (11/02/22 1435)  Pulse: 73 (11/02/22 1803)  Resp: 20 (11/02/22 1540)  BP: (!) 98/59 (11/02/22 1803)  SpO2: 99 % (11/02/22 1803)   Vital Signs (24h Range):  Temp:  [97.5 °F (36.4 °C)-98.6 °F (37 °C)] 97.7 °F (36.5 °C)  Pulse:  [70-92] 73  Resp:  [18-20] 20  SpO2:  [98 %-100 %] 99 %  BP: ()/(51-60) 98/59                          Physical Exam    Neurosurgery Physical Exam    GENERAL: restless; pulling at lines  HEENT: L periorbital ecchymosis, PERRL, mucous membranes moist  NECK: supple, trachea  midline  CV: normal capillary refill  PULM: aerating well, symmetric expansion, no distress  ABD: soft, NT, ND  EXT: no c/c/e    NEURO:    E2V2M5  Grunts and grimaces to noxious stimuli  PERRL  LACKEY spontaneous antigravity        Significant Labs:  Recent Labs   Lab 11/02/22  0947      K 4.1      CO2 31   BUN 22*   CREATININE 0.86   CALCIUM 8.6     Recent Labs   Lab 11/02/22  0947 11/02/22  1533   WBC 7.8 6.90   HGB 14.0 13.9   HCT 42.2 41.8    261     Recent Labs   Lab 11/02/22  1533   INR 1.0     Microbiology Results (last 7 days)       ** No results found for the last 168 hours. **          All pertinent labs from the last 24 hours have been reviewed.    Significant Diagnostics:  I have reviewed all pertinent imaging results/findings within the past 24 hours.  CT Head Without Contrast    Result Date: 11/2/2022  Small amount of subarachnoid hemorrhage within right inferior frontal lobe and right anterior temporal lobe.  No midline shift or acute large vascular territory infarct. Few additional findings as above. This report was flagged in Epic as abnormal. Electronically signed by resident: Saturnino Lindsay Date:    11/02/2022 Time:    19:07 Electronically signed by: Ravi Lord MD Date:    11/02/2022 Time:    19:22    CT Cervical Spine Without Contrast    Result Date: 11/2/2022  No acute fractures identified. Spondylosis of the cervical spine as detailed above, no apparent canal stenosis or foraminal narrowing. Cerumen impacted right external ear canal Electronically signed by: Dea Martin MD Date:    11/02/2022 Time:    16:10     Assessment/Plan:     SAH (subarachnoid hemorrhage)  52F h/o questionable prior TIA without residual deficits, intellectual disability secondary to Down syndrome requiring daily assistance with ADLs, presents as transfer from OSH for possible seizure event and ground level fall. CTH from OSH reviewed; there is moderate bifrontal SAH with probably frontal  contusions. There are no obvious skull fractures. Her presentation is consistent with non-aneurysmal SAH.     --Admit to [facility] with q1h neurochecks.   --All labs and diagnostics reviewed.   --Obtain CTA at 6h for interval stability and to assess for underlying vascular lesions.   --SBP < 140  --Keppra 500 BID  --cvEEG  --Na>135  --NPO  --Please page with exam change or questions.     Plan d/w Dr. Frey.     Dispo: Admit to ICU.       Thank you for your consult. I will follow-up with patient. Please contact us if you have any additional questions.    Avtar Carrion MD  Neurosurgery  [facility] - Emergency Dept

## 2022-11-03 NOTE — ASSESSMENT & PLAN NOTE
52F h/o questionable prior TIA without residual deficits, intellectual disability secondary to Down syndrome requiring daily assistance with ADLs, presents as transfer from OSH for possible seizure event and ground level fall. CTH from OSH reviewed; there is moderate bifrontal SAH with probably frontal contusions. There are no obvious skull fractures. Her presentation is consistent with non-aneurysmal SAH.     --Admit to [facility] with q1h neurochecks.   --All labs and diagnostics reviewed.   --Obtain CTA at 6h for interval stability: Negative for vascular anomaly.    --SBP < 140  --Keppra 500 BID  --cvEEG today  --obtain CT head tomorrow.   --CT C Spine negative for acute fracture  --MRI Brain may obtain outpatient  --Na 140-150  --Please page with exam change or questions.     Plan d/w Dr. Frey.     Dispo: Admit to ICU.

## 2022-11-03 NOTE — ASSESSMENT & PLAN NOTE
51 y/o female with Down syndrome with frontal subarachnoid hemorrhage following syncopal fall this morning.   - Admitted to [facility]  - CT on arrival to C stable from OSH imaging  - Imaging consistent with traumatic hemorrhage, however given syncope and family h/o aneurysm, will obtain CTA at 0100  - [facility]  - Neurosurgery consult  - Hold ASA  - SBP goal 100-140   - EEG  - Eunatremia   - Hourly neuro checks   - PT/OT/SLP

## 2022-11-04 VITALS
BODY MASS INDEX: 16.23 KG/M2 | TEMPERATURE: 98 F | SYSTOLIC BLOOD PRESSURE: 133 MMHG | RESPIRATION RATE: 21 BRPM | OXYGEN SATURATION: 95 % | WEIGHT: 70.13 LBS | HEART RATE: 90 BPM | HEIGHT: 55 IN | DIASTOLIC BLOOD PRESSURE: 90 MMHG

## 2022-11-04 LAB
ALBUMIN SERPL BCP-MCNC: 3.3 G/DL (ref 3.5–5.2)
ALP SERPL-CCNC: 54 U/L (ref 55–135)
ALT SERPL W/O P-5'-P-CCNC: 37 U/L (ref 10–44)
ANION GAP SERPL CALC-SCNC: 11 MMOL/L (ref 8–16)
AST SERPL-CCNC: 62 U/L (ref 10–40)
BASOPHILS # BLD AUTO: 0.06 K/UL (ref 0–0.2)
BASOPHILS NFR BLD: 1.4 % (ref 0–1.9)
BILIRUB SERPL-MCNC: 0.9 MG/DL (ref 0.1–1)
BUN SERPL-MCNC: 9 MG/DL (ref 6–20)
CALCIUM SERPL-MCNC: 8.5 MG/DL (ref 8.7–10.5)
CHLORIDE SERPL-SCNC: 111 MMOL/L (ref 95–110)
CO2 SERPL-SCNC: 19 MMOL/L (ref 23–29)
CREAT SERPL-MCNC: 0.7 MG/DL (ref 0.5–1.4)
DIFFERENTIAL METHOD: ABNORMAL
EOSINOPHIL # BLD AUTO: 0 K/UL (ref 0–0.5)
EOSINOPHIL NFR BLD: 0.5 % (ref 0–8)
ERYTHROCYTE [DISTWIDTH] IN BLOOD BY AUTOMATED COUNT: 13.8 % (ref 11.5–14.5)
EST. GFR  (NO RACE VARIABLE): >60 ML/MIN/1.73 M^2
GLUCOSE SERPL-MCNC: 95 MG/DL (ref 70–110)
HCT VFR BLD AUTO: 38.5 % (ref 37–48.5)
HGB BLD-MCNC: 12.4 G/DL (ref 12–16)
IMM GRANULOCYTES # BLD AUTO: 0.01 K/UL (ref 0–0.04)
IMM GRANULOCYTES NFR BLD AUTO: 0.2 % (ref 0–0.5)
LYMPHOCYTES # BLD AUTO: 1.2 K/UL (ref 1–4.8)
LYMPHOCYTES NFR BLD: 27.9 % (ref 18–48)
MAGNESIUM SERPL-MCNC: 1.9 MG/DL (ref 1.6–2.6)
MCH RBC QN AUTO: 33.4 PG (ref 27–31)
MCHC RBC AUTO-ENTMCNC: 32.2 G/DL (ref 32–36)
MCV RBC AUTO: 104 FL (ref 82–98)
MONOCYTES # BLD AUTO: 0.4 K/UL (ref 0.3–1)
MONOCYTES NFR BLD: 8.4 % (ref 4–15)
NEUTROPHILS # BLD AUTO: 2.6 K/UL (ref 1.8–7.7)
NEUTROPHILS NFR BLD: 61.6 % (ref 38–73)
NRBC BLD-RTO: 0 /100 WBC
PHOSPHATE SERPL-MCNC: 3.5 MG/DL (ref 2.7–4.5)
PLATELET # BLD AUTO: 234 K/UL (ref 150–450)
PMV BLD AUTO: 10.4 FL (ref 9.2–12.9)
POCT GLUCOSE: 99 MG/DL (ref 70–110)
POTASSIUM SERPL-SCNC: 3.7 MMOL/L (ref 3.5–5.1)
PROT SERPL-MCNC: 6.6 G/DL (ref 6–8.4)
RBC # BLD AUTO: 3.71 M/UL (ref 4–5.4)
SODIUM SERPL-SCNC: 141 MMOL/L (ref 136–145)
SODIUM SERPL-SCNC: 142 MMOL/L (ref 136–145)
THYROPEROXIDASE IGG SERPL-ACNC: 285.5 IU/ML
WBC # BLD AUTO: 4.16 K/UL (ref 3.9–12.7)

## 2022-11-04 PROCEDURE — 94761 N-INVAS EAR/PLS OXIMETRY MLT: CPT

## 2022-11-04 PROCEDURE — 84100 ASSAY OF PHOSPHORUS: CPT | Performed by: PHYSICIAN ASSISTANT

## 2022-11-04 PROCEDURE — 99223 1ST HOSP IP/OBS HIGH 75: CPT | Mod: ,,, | Performed by: PSYCHIATRY & NEUROLOGY

## 2022-11-04 PROCEDURE — 99239 HOSP IP/OBS DSCHRG MGMT >30: CPT | Mod: 95,,, | Performed by: PSYCHIATRY & NEUROLOGY

## 2022-11-04 PROCEDURE — 80053 COMPREHEN METABOLIC PANEL: CPT | Performed by: PHYSICIAN ASSISTANT

## 2022-11-04 PROCEDURE — 99239 PR HOSPITAL DISCHARGE DAY,>30 MIN: ICD-10-PCS | Mod: 95,,, | Performed by: PSYCHIATRY & NEUROLOGY

## 2022-11-04 PROCEDURE — 92610 EVALUATE SWALLOWING FUNCTION: CPT

## 2022-11-04 PROCEDURE — 97535 SELF CARE MNGMENT TRAINING: CPT

## 2022-11-04 PROCEDURE — 25000003 PHARM REV CODE 250: Performed by: PHYSICIAN ASSISTANT

## 2022-11-04 PROCEDURE — 97530 THERAPEUTIC ACTIVITIES: CPT

## 2022-11-04 PROCEDURE — 99223 PR INITIAL HOSPITAL CARE,LEVL III: ICD-10-PCS | Mod: FS,,, | Performed by: PHYSICIAN ASSISTANT

## 2022-11-04 PROCEDURE — 97165 OT EVAL LOW COMPLEX 30 MIN: CPT

## 2022-11-04 PROCEDURE — 97162 PT EVAL MOD COMPLEX 30 MIN: CPT

## 2022-11-04 PROCEDURE — 25000003 PHARM REV CODE 250: Performed by: STUDENT IN AN ORGANIZED HEALTH CARE EDUCATION/TRAINING PROGRAM

## 2022-11-04 PROCEDURE — 99223 PR INITIAL HOSPITAL CARE,LEVL III: ICD-10-PCS | Mod: ,,, | Performed by: PSYCHIATRY & NEUROLOGY

## 2022-11-04 PROCEDURE — 83735 ASSAY OF MAGNESIUM: CPT | Performed by: PHYSICIAN ASSISTANT

## 2022-11-04 PROCEDURE — 99223 1ST HOSP IP/OBS HIGH 75: CPT | Mod: FS,,, | Performed by: PHYSICIAN ASSISTANT

## 2022-11-04 PROCEDURE — 25000003 PHARM REV CODE 250

## 2022-11-04 PROCEDURE — 85025 COMPLETE CBC W/AUTO DIFF WBC: CPT | Performed by: PHYSICIAN ASSISTANT

## 2022-11-04 PROCEDURE — 86376 MICROSOMAL ANTIBODY EACH: CPT | Performed by: STUDENT IN AN ORGANIZED HEALTH CARE EDUCATION/TRAINING PROGRAM

## 2022-11-04 PROCEDURE — 63600175 PHARM REV CODE 636 W HCPCS: Performed by: PHYSICIAN ASSISTANT

## 2022-11-04 PROCEDURE — 84295 ASSAY OF SERUM SODIUM: CPT

## 2022-11-04 RX ORDER — ASPIRIN 81 MG/1
162 TABLET ORAL DAILY
Refills: 0
Start: 2022-11-04 | End: 2022-12-02

## 2022-11-04 RX ORDER — LEVETIRACETAM 250 MG/1
250 TABLET ORAL EVERY 12 HOURS
Qty: 60 TABLET | Refills: 3 | Status: SHIPPED | OUTPATIENT
Start: 2022-11-04 | End: 2022-12-02 | Stop reason: SDUPTHER

## 2022-11-04 RX ORDER — LEVOTHYROXINE SODIUM 50 UG/1
50 TABLET ORAL
Qty: 30 TABLET | Refills: 3 | Status: SHIPPED | OUTPATIENT
Start: 2022-11-05 | End: 2022-12-02 | Stop reason: SDUPTHER

## 2022-11-04 RX ADMIN — LEVOTHYROXINE SODIUM 50 MCG: 50 TABLET ORAL at 05:11

## 2022-11-04 RX ADMIN — OLANZAPINE 2.5 MG: 2.5 TABLET, FILM COATED ORAL at 08:11

## 2022-11-04 RX ADMIN — LEVETIRACETAM 250 MG: 250 TABLET, FILM COATED ORAL at 08:11

## 2022-11-04 RX ADMIN — MIDAZOLAM 2 MG: 1 INJECTION INTRAMUSCULAR; INTRAVENOUS at 12:11

## 2022-11-04 RX ADMIN — ATORVASTATIN CALCIUM 40 MG: 40 TABLET, FILM COATED ORAL at 08:11

## 2022-11-04 NOTE — PROGRESS NOTES
[facility] Critical Care  Neurocritical Care  Progress Note    Admit Date: 11/2/2022  Service Date: 11/04/2022  Length of Stay: 2    Subjective:     Chief Complaint: Subarachnoid hemorrhage    History of Present Illness: Ms. Potter is a 53 y/o female with PMH significant for Down Syndrome with severe intellectual delay presents as a transfer for evaluation and management of SAH. Patient lives in an assisted living facility and history provided by caretakers at bedside. Patient was in her normal state of health this morning when she had a witnessed episode of syncope and collapse this morning at approximately 7 AM. She reportedly became acutely contracted in her upper extremities, fell face down onto the ground, and then convulsed for a couple seconds. She was brought to an outside facility where CTH revealed bifrontal SAH. CT face also completed which did not show any fractures. [place] [facility] [place]    Patient is on daily ASA 81 mg, no other blood thinners or antiplatelets. She has no h/o seizures or syncope. Her father had a ruptured cerebral aneurysm in the 1980s.     At baseline, patient oriented to self but minimally verbal. She ambulates on her own and feeds herself, requires assistance for some ADLs such as bathing.       Hospital Course: 11/03/2022 Patient doing well, at baseline per caregivers. No ICU needs at this time. Na goals remain at 135-145 per neuro ICU.  11/04/2022 Patient remains stable, no epileptiform seizures noted on EEG. Decision was made to discharge with continued keppra (though not explicitly recommended by epilepsy), with plans for outpatient follow up with patient's neurologist Dr. Yap who can make that determination.      Interval History:  Patient remains stable, no epileptiform seizures noted on EEG. Decision was made to discharge with continued keppra (though not explicitly recommended by epilepsy), with plans for outpatient follow up with  patient's neurologist Dr. Yap who can make that determination.    Review of Systems   Reason unable to perform ROS: Pt with severe intellectual disability.   Constitutional:  Negative for fever.   Respiratory:  Negative for cough.    Gastrointestinal:  Negative for vomiting.   Neurological:  Positive for seizures.     Objective:     Vitals:  Temp: 98.1 °F (36.7 °C)  Pulse: 79  Rhythm: normal sinus rhythm  BP: (!) 94/51  MAP (mmHg): 70  Resp: 19  SpO2: 97 %  O2 Device (Oxygen Therapy): room air    Temp  Min: 98.1 °F (36.7 °C)  Max: 98.6 °F (37 °C)  Pulse  Min: 56  Max: 97  BP  Min: 90/55  Max: 123/58  MAP (mmHg)  Min: 68  Max: 88  Resp  Min: 10  Max: 29  SpO2  Min: 95 %  Max: 98 %    11/03 0701 - 11/04 0700  In: 535.6 [I.V.:535.6]  Out: -    Unmeasured Output  Urine Occurrence: 1  Stool Occurrence: 1  Pad Count: 1       Physical Exam  Vitals reviewed.   HENT:      Mouth/Throat:      Mouth: Mucous membranes are moist.   Eyes:      Comments: R periorbital haematoma   Cardiovascular:      Rate and Rhythm: Normal rate.   Musculoskeletal:      Cervical back: Neck supple.   Neurological:      Mental Status: She is alert.     Neurological Exam:  MENTAL STATUS  Level of consciousness: alert  Orientation: oriented to person, disoriented to place, and time  Attention reduced. Concentration reduced  Speech: normal    CRANIAL NERVES  CN II: Visual fields full to confrontation  CN III, IV, VI: PERRL, EOMI  CN V: Facial sensation intact  CN VII: Facial expression symmetric and full  CN VIII: Hearing intact to finger rub  CN IX, X: Symmetric palate elevation. Phonation normal  CN XI: Shoulder shrug and head turn intact bilaterally  CN XII: Tongue midline with normal movements, no atrophy    MOTOR EXAM  Muscle bulk: reduced  Muscle tone: difficult to assess as patient resists attempts at passive movement    Strength - Upper Extremities   Arm abduction Elbow flexion Elbow extension Wrist flexion Wrist extension   Right 4/5 4/5 4/5  4/5 4/5   Left 4/5 4/5 4/5 4/5 4/5     Strength - Lower Extremities   Hip flexion Knee flexion Knee extension Dorsiflexion Plantarflexion   Right 4/5 4/5 4/5 4/5 4/5   Left 4/5 4/5 4/5 4/5 4/5       REFLEXES   Biceps Triceps Brachioradialis Patellar Achilles   Right +2 +2 +2 +1 +2   Left +2 +2 +2 +1 +2     SENSORY EXAM  Light touch: intact in all 4 extremities    COORDINATION  Unable to formally assess 2/2 lack of cooperation, but UE coordination appears to be intact    Medications:  Continuous   Scheduledatorvastatin, 40 mg, Daily  levETIRAcetam, 250 mg, Q12H  levothyroxine, 50 mcg, Before breakfast  OLANZapine, 2.5 mg, Daily  PRNacetaminophen, 650 mg, Q6H PRN  labetalol, 10 mg, Q4H PRN  ondansetron, 4 mg, Q8H PRN  sodium chloride 0.9%, 10 mL, PRN  sodium chloride 0.9% infusion, 500 mL, Once PRN    Today I personally reviewed pertinent medications, lines/drains/airways, imaging, cardiology results, laboratory results, microbiology results, notably:    Diet  Diet Lactose Restricted  Diet Lactose Restricted        Assessment/Plan:     Neuro  * Subarachnoid hemorrhage  51 y/o female with Down syndrome with frontal subarachnoid hemorrhage following syncopal fall 11/2/2022 morning.  CTH on arrival to [facility] stable from OSH imaging, consistent with traumatic hemorrhage. CTA stable but extensive assessment limited by motion abnormalities    - Na goals remain Eunatremia per Neuro ICU  - Keppra 250 mg BID, to follow up with Neurologist in [facility]: Dr. Yap  - Neurosurgery following, appreciate recs, outpatient follow up planned  - Hold ASA, until outpatient follow up with neurosurgery  - SBP goal 100-140   - EEG did not reveal epileptiform seizures  - Hourly neuro checks   - PT/OT/SLP     Intellectual delay  Down Syndrome   - Lives in assisted living, caregivers at bedside are medical decision makers.     Endocrine  Primary hypothyroidism  [place] hypothyroid labs (TSH 19.8, fT4 slightly low at 0.65).    -- endocrinology  consulted, greatly appreciate recs  -- levothyroxine 50 mcg QD  -- per endocrinology, pt clinically euthyroid, symptoms history limited by cognitive impairment  -- TPO Ab ordered by endocrinology was elevated, suspected Hashimotos  -- Recommending repeat TSH in 4-6 weeks by PCP    Genetic  Down syndrome  Cognitive impairment, lives in group home    Other  Syncope and collapse  Syncopal fall with reported convulsion at her living facility  - Loaded with Keppra 1.5g  - EEG, did not reveal epileptiform seizures  - Echo, normal EF 60%, but with mitral regurgitation  - EKG  - Hourly neuro checks           The patient is being Prophylaxed for:  Venous Thromboembolism with: None  Stress Ulcer with: None  Ventilator Pneumonia with: none    Activity Orders          Diet Lactose Restricted: Lactose Restricted starting at 11/03 0640    Turn patient starting at 11/02 2200    Elevate HOB starting at 11/02 2039        Full Code    Shahzad Quispe MD  Neurocritical Care  [facility] - Neuro Critical Care

## 2022-11-04 NOTE — PLAN OF CARE
[facility] Care Plan    POC reviewed with Anabella Potter and family at 1400. Pt caregivers verbalized understanding. Questions and concerns addressed. No acute events today. Pt progressing toward goals. Will continue to monitor. See below and flowsheets for full assessment and VS info.     - Ambulated with PT/OT  - Tolerating diet   - Possible discharge       Is this a stroke patient? yes- Stroke booklet reviewed with family, risk factors identified for patient and stroke booklet remains at bedside for ongoing education.     Neuro:  Shaun Coma Scale  Best Eye Response: 4-->(E4) spontaneous  Best Motor Response: 5-->(M5) localizes pain  Best Verbal Response: 2-->(V2) incomprehensible speech  Shaun Coma Scale Score: 11  Assessment Qualifiers: patient not sedated/intubated, no eye obstruction present  Pupil PERRLA: yes     24 hr Temp:  [98.1 °F (36.7 °C)-98.6 °F (37 °C)]     CV:   Rhythm: normal sinus rhythm  BP goals:   SBP < 140  MAP > 65    Resp:   O2 Device (Oxygen Therapy): room air       Plan: N/A    GI/:     Diet/Nutrition Received: regular  Last Bowel Movement: 11/04/22  Voiding Characteristics: incontinence    Intake/Output Summary (Last 24 hours) at 11/4/2022 1620  Last data filed at 11/3/2022 1801  Gross per 24 hour   Intake 535.55 ml   Output --   Net 535.55 ml     Unmeasured Output  Urine Occurrence: 1  Stool Occurrence: 1  Pad Count: 1    Labs/Accuchecks:  Recent Labs   Lab 11/04/22 0047   WBC 4.16   RBC 3.71*   HGB 12.4   HCT 38.5         Recent Labs   Lab 11/04/22 0047 11/04/22  0519    142   K 3.7  --    CO2 19*  --    *  --    BUN 9  --    CREATININE 0.7  --    ALKPHOS 54*  --    ALT 37  --    AST 62*  --    BILITOT 0.9  --       Recent Labs   Lab 11/02/22  1533   INR 1.0    No results for input(s): CPK, CPKMB, TROPONINI, MB in the last 168 hours.    Electrolytes: N/A - electrolytes WDL  Accuchecks: none    Gtts:      LDA/Wounds:  Lines/Drains/Airways       Peripheral Intravenous  Line  Duration                  Peripheral IV - Single Lumen 11/02/22 2147 22 G Left Hand 1 day         Peripheral IV - Single Lumen 11/04/22 0524 20 G Posterior;Right Wrist <1 day                  Wounds: Yes  Wound care consulted: Yes

## 2022-11-04 NOTE — PLAN OF CARE
Problem: Physical Therapy  Goal: Physical Therapy Goal  Description: No goals established at this time, see eval   Outcome: Met

## 2022-11-04 NOTE — PLAN OF CARE
Bedside swallow study completed. Recommend regular diet/thin liquids at this time. SLC aspects are at baseline at this time.   11/4/2022

## 2022-11-04 NOTE — PT/OT/SLP EVAL
Physical Therapy Evaluation and Discharge Note with OT    Patient Name:  Anabella Potter   MRN:  [id number]    *Co-treatment with OT due to expected patient complexity and need for two skilled therapists to ensure safe mobilization.    Recommendations:     Discharge Recommendations:   (return to living facility)   Discharge Equipment Recommendations: none   Barriers to discharge: None    Assessment:     Anabella Potter is a 52 y.o. female admitted with a medical diagnosis of Subarachnoid hemorrhage. Pt completed bed mobility, gait, and toilet t/f with close SBA.  At this time, patient is functioning at their prior level of function and does not require further acute PT services.     Recent Surgery: * No surgery found *      Plan:     During this hospitalization, patient does not require further acute PT services.  Please re-consult if situation changes.      Subjective     Chief Complaint: Subarachnoid hemorrhage   Patient/Family Comments/goals: None stated  Pain/Comfort:  Pain Rating 1: 0/10  Pain Rating Post-Intervention 1: 0/10    Patients cultural, spiritual, Anabaptist conflicts given the current situation: no    Living Environment:  Pt lives in an assisted living facility with 24/7 assistance.  Prior to admission, patients level of function was (I) for functional mobility and required some assist with bathing and dressing.  Equipment used at home: none.  DME owned (not currently used): none.  Upon discharge, patient will have assistance from 24/7 caregivers.    Objective:     Communicated with RN prior to session.  Patient found HOB elevated with telemetry, pulse ox (continuous) upon PT entry to room.    General Precautions: Standard, fall, seizure   Orthopedic Precautions:N/A   Braces: N/A   Respiratory Status: Room air    Exams:  Cognitive Exam:  Patient is oriented to Person  LUE Strength: WFL  RLE ROM: WNL  RLE Strength: WFL  LLE ROM: WNL    Functional Mobility:  Bed Mobility:     Supine to Sit: stand by  assistance  Sit to Supine: total assistance with assist from caregiver as pt resisted returning to bed  Transfers:     Sit to Stand:  stand by assistance with no AD  Toilet Transfer: stand by assistance with  no AD  using  Step Transfer  Gait:   Pt ambulated in room ~25' with close SBA and no AD. Pt did not follow commands and did not allow therapists to assist 2/2 sensory avoidance    AM-PAC 6 CLICK MOBILITY  Total Score:21       Treatment and Education:   Pt assisted with mobility as noted above.   PT educated pt and caregivers on role of PT in acute care and PT POC  Discussed d/c from acute PT services, caregivers agreeable and state pt is at her baseline    AM-PAC 6 CLICK MOBILITY  Total Score:21     Patient left HOB elevated with all lines intact, call button in reach, bed alarm on, RN notified, and caregiver present.    GOALS:   Multidisciplinary Problems       Physical Therapy Goals       Not on file              Multidisciplinary Problems (Resolved)          Problem: Physical Therapy    Goal Priority Disciplines Outcome Goal Variances Interventions   Physical Therapy Goal   (Resolved)     PT, PT/OT Met     Description: No goals established at this time, see eval                        History:     Past Medical History:   Diagnosis Date    Anorexia     Trisomy        No past surgical history on file.    Time Tracking:     PT Received On: 11/04/22  PT Start Time: 0956     PT Stop Time: 1015  PT Total Time (min): 19 min     Billable Minutes: Evaluation 10 and Therapeutic Activity 9      11/04/2022

## 2022-11-04 NOTE — ASSESSMENT & PLAN NOTE
53 y/o female with Down syndrome with frontal subarachnoid hemorrhage following syncopal fall 11/2/2022 morning.  CTH on arrival to [facility] stable from OSH imaging, consistent with traumatic hemorrhage. CTA stable but extensive assessment limited by motion abnormalities    - Na goals remain Eunatremia per Neuro ICU  - Keppra 250 mg BID, to follow up with Neurologist in [facility]: Dr. Yap  - Neurosurgery following, appreciate recs, outpatient follow up planned  - Hold ASA, until outpatient follow up with neurosurgery  - SBP goal 100-140   - EEG did not reveal epileptiform seizures  - Hourly neuro checks   - PT/OT/SLP

## 2022-11-04 NOTE — SUBJECTIVE & OBJECTIVE
Interval History:  Patient remains stable, no epileptiform seizures noted on EEG. Decision was made to discharge with continued keppra (though not explicitly recommended by epilepsy), with plans for outpatient follow up with patient's neurologist Dr. Yap who can make that determination.    Review of Systems   Reason unable to perform ROS: Pt with severe intellectual disability.   Constitutional:  Negative for fever.   Respiratory:  Negative for cough.    Gastrointestinal:  Negative for vomiting.   Neurological:  Positive for seizures.     Objective:     Vitals:  Temp: 98.1 °F (36.7 °C)  Pulse: 79  Rhythm: normal sinus rhythm  BP: (!) 94/51  MAP (mmHg): 70  Resp: 19  SpO2: 97 %  O2 Device (Oxygen Therapy): room air    Temp  Min: 98.1 °F (36.7 °C)  Max: 98.6 °F (37 °C)  Pulse  Min: 56  Max: 97  BP  Min: 90/55  Max: 123/58  MAP (mmHg)  Min: 68  Max: 88  Resp  Min: 10  Max: 29  SpO2  Min: 95 %  Max: 98 %    11/03 0701 - 11/04 0700  In: 535.6 [I.V.:535.6]  Out: -    Unmeasured Output  Urine Occurrence: 1  Stool Occurrence: 1  Pad Count: 1       Physical Exam  Vitals reviewed.   HENT:      Mouth/Throat:      Mouth: Mucous membranes are moist.   Eyes:      Comments: R periorbital haematoma   Cardiovascular:      Rate and Rhythm: Normal rate.   Musculoskeletal:      Cervical back: Neck supple.   Neurological:      Mental Status: She is alert.     Neurological Exam:  MENTAL STATUS  Level of consciousness: alert  Orientation: oriented to person, disoriented to place, and time  Attention reduced. Concentration reduced  Speech: normal    CRANIAL NERVES  CN II: Visual fields full to confrontation  CN III, IV, VI: PERRL, EOMI  CN V: Facial sensation intact  CN VII: Facial expression symmetric and full  CN VIII: Hearing intact to finger rub  CN IX, X: Symmetric palate elevation. Phonation normal  CN XI: Shoulder shrug and head turn intact bilaterally  CN XII: Tongue midline with normal movements, no atrophy    MOTOR  EXAM  Muscle bulk: reduced  Muscle tone: difficult to assess as patient resists attempts at passive movement    Strength - Upper Extremities   Arm abduction Elbow flexion Elbow extension Wrist flexion Wrist extension   Right 4/5 4/5 4/5 4/5 4/5   Left 4/5 4/5 4/5 4/5 4/5     Strength - Lower Extremities   Hip flexion Knee flexion Knee extension Dorsiflexion Plantarflexion   Right 4/5 4/5 4/5 4/5 4/5   Left 4/5 4/5 4/5 4/5 4/5       REFLEXES   Biceps Triceps Brachioradialis Patellar Achilles   Right +2 +2 +2 +1 +2   Left +2 +2 +2 +1 +2     SENSORY EXAM  Light touch: intact in all 4 extremities    COORDINATION  Unable to formally assess 2/2 lack of cooperation, but UE coordination appears to be intact    Medications:  Continuous   Scheduledatorvastatin, 40 mg, Daily  levETIRAcetam, 250 mg, Q12H  levothyroxine, 50 mcg, Before breakfast  OLANZapine, 2.5 mg, Daily  PRNacetaminophen, 650 mg, Q6H PRN  labetalol, 10 mg, Q4H PRN  ondansetron, 4 mg, Q8H PRN  sodium chloride 0.9%, 10 mL, PRN  sodium chloride 0.9% infusion, 500 mL, Once PRN    Today I personally reviewed pertinent medications, lines/drains/airways, imaging, cardiology results, laboratory results, microbiology results, notably:    Diet  Diet Lactose Restricted  Diet Lactose Restricted

## 2022-11-04 NOTE — DISCHARGE SUMMARY
[facility]  Neurocritical Care  Discharge Summary    Admit Date: 11/2/2022    Service Date: 11/04/2022    Discharge Date:     Length of Stay: 2    Final Active Diagnoses:    Diagnosis Date Noted POA    PRINCIPAL PROBLEM:  Subarachnoid hemorrhage [I60.9] 11/02/2022 Yes    Primary hypothyroidism [E03.9] 11/03/2022 Unknown    Down syndrome [Q90.9] 11/03/2022 Not Applicable    Intellectual delay [F81.9] 11/02/2022 Yes    Syncope and collapse [R55] 11/02/2022 Yes      Problems Resolved During this Admission:      History of Present Illness: Ms. Potter is a 53 y/o female with PMH significant for Down Syndrome with severe intellectual delay presents as a transfer for evaluation and management of SAH. Patient lives in an assisted living facility and history provided by caretakers at bedside. Patient was in her normal state of health this morning when she had a witnessed episode of syncope and collapse this morning at approximately 7 AM. She reportedly became acutely contracted in her upper extremities, fell face down onto the ground, and then convulsed for a couple seconds. She was brought to an outside facility where CTH revealed bifrontal SAH. CT face also completed which did not show any fractures. [place] [facility] [place]    Patient is on daily ASA 81 mg, no other blood thinners or antiplatelets. She has no h/o seizures or syncope. Her father had a ruptured cerebral aneurysm in the 1980s.     At baseline, patient oriented to self but minimally verbal. She ambulates on her own and feeds herself, requires assistance for some ADLs such as bathing.       Hospital Course by Event: 11/03/2022 Patient doing well, at baseline per caregivers. No ICU needs at this time. Na goals remain at 135-145 per neuro ICU.  11/04/2022 Patient remains stable, no epileptiform seizures noted on EEG. Decision was made to discharge with continued keppra (though not explicitly recommended by  epilepsy), with plans for outpatient follow up with patient's neurologist Dr. Yap who can make that determination.      Hospital Course by Problem:   * Subarachnoid hemorrhage  53 y/o female with Down syndrome with frontal subarachnoid hemorrhage following syncopal fall 11/2/2022 morning.  CTH on arrival to [facility] stable from OSH imaging, consistent with traumatic hemorrhage. CTA stable but extensive assessment limited by motion abnormalities    - Na goals remain Eunatremia per Neuro ICU  - Keppra 250 mg BID, to follow up with Neurologist in [place]: Dr. Yap  - Neurosurgery following, appreciate recs, outpatient follow up planned  - Hold ASA, until outpatient follow up with neurosurgery  - SBP goal 100-140   - EEG did not reveal epileptiform seizures  - Hourly neuro checks   - PT/OT/SLP     Down syndrome  Cognitive impairment, lives in group home    Primary hypothyroidism  [place] hypothyroid labs (TSH 19.8, fT4 slightly low at 0.65).    -- endocrinology consulted, greatly appreciate recs  -- levothyroxine 50 mcg QD  -- per endocrinology, pt clinically euthyroid, symptoms history limited by cognitive impairment  -- TPO Ab ordered by endocrinology was elevated, suspected Hashimotos  -- Recommending repeat TSH in 4-6 weeks by PCP    Syncope and collapse  Syncopal fall with reported convulsion at her living facility  - Loaded with Keppra 1.5g  - EEG, did not reveal epileptiform seizures  - Echo, normal EF 60%, but with mitral regurgitation  - EKG  - Hourly neuro checks     Intellectual delay  Down Syndrome   - Lives in assisted living, caregivers at bedside are medical decision makers.         Goals of Care Treatment Preferences:  Code Status: Full Code      Significant Results (Reverse Chronological):  Imaging:    CT Head Without Contrast  Order: [id number]   Status: Final result      Visible to patient: Yes (not seen)      Next appt: None      0 Result Notes  Details    Reading Physician Reading Date Result  Priority   Phil Escobar MD  [phone]  [phone] 11/4/2022 Timed   Jason Albert MD  [phone] 11/4/2022      Narrative & Impression  EXAMINATION:  CT HEAD WITHOUT CONTRAST     CLINICAL HISTORY:  f/u SAH;     TECHNIQUE:  Low dose axial CT images obtained throughout the head without the use of intravenous contrast.  Axial, sagittal and coronal reconstructions were performed.     COMPARISON:  CT head 11/02/2022.     CTA head neck 11/03/2022.     FINDINGS:  Intracranial compartment:     Stable pattern of cerebral volume loss, with prominence of the ventricles and sulci for age.  Slight temporal predominance.   No overt hydrocephalus. No new intraventricular hemorrhage.     Stable small volume scattered subarachnoid hemorrhage, within the sulci of the right frontal and temporal lobes.  No mass effect or adjacent parenchymal edema.  No new extra-axial blood or fluid collections elsewhere.     Remaining brain appears unchanged.  No new parenchymal mass, hemorrhage, edema or major vascular distribution infarct.     Skull/extracranial contents (limited evaluation):     No fracture. Mastoid air cells and paranasal sinuses are essentially clear.     Impression:     Stable small volume subarachnoid hemorrhage as further discussed above.     Electronically signed by resident: Jason Albert  Date:                                            11/04/2022  Time:                                           08:09     Electronically signed by: Phil Escobar MD  Date:                                            11/04/2022  Time:                                           13:44     CTA Head and Neck (xpd)      0 Result Notes  Details  Narrative & Impression  EXAMINATION:  CTA HEAD AND NECK (XPD)     CLINICAL HISTORY:  SAH;     TECHNIQUE:  Axial CT images obtained throughout the region of the head before and after the administration of intravenous contrast.  CT angiogram was performed from through the cervical  and intracranial vasculature during the IV bolus administration of 75mL of Omnipaque 350.  Multiplanar MPR and MIP reformats were performed.     CT source data was analyzed using artificial intelligence software for detection of a large vessel occlusions (LVO) in order to enable computer assisted triage notification and aid clinical stroke decision making.     COMPARISON:  CT head 11/02/2022     FINDINGS:  Limited evaluation due to motion artifact.     The brain parenchyma demonstrates similar amount of small subarachnoid hemorrhage within the inferior aspect the right frontal lobe and anterior aspect the right temporal lobe.     No new extra-axial blood or fluid collections are identified.     Ventricles are unchanged in size.  No hydrocephalus.     No new hemorrhage or edema.  No new mass.  No sulcal effacement.  Gray-white matter differentiation is preserved.  No acute major vascular distribution infarct.     The sellar region and craniocervical junction are unremarkable.     No displaced calvarial fracture.     Mucosal thickening of the maxillary sinuses.  Mastoid air cells and remaining paranasal sinuses are essentially clear.     Salivary glands are without significant abnormality.     Thyroid is without significant abnormality.     Trachea is patent.     No significant abnormality within the lungs.     Cystic change within C5 vertebral body.        CTA:     Left-sided aortic arch with 3 branching vessels.     The common and internal carotid arteries are normal in course and caliber. No significant stenosis in either carotid bifurcation.     The vertebral origins are patent. The left vertebral hypoplastic.  Vertebrobasilar system hypoplastic.     The anterior, middle, and posterior cerebral arteries are within normal limits.  Bilateral posterior cerebral arteries are supplied majority by the posterior communicating arteries.  No significant stenosis, focal occlusion, or intracranial aneurysm  formation.     Impression:     Motion limited study.     Small amount of subarachnoid hemorrhage within the right inferior frontal lobe and right anterior temporal lobe.  No new hemorrhage.  No midline shift.     CTA demonstrates patent anterior and posterior circulation.  Posterior cerebral arteries are predominant supplied by the posterior communicating arteries.  No significant stenosis or aneurysm identified on motion limited study.     Electronically signed by resident: Saturnino Lindsay  Date:                                            11/03/2022  Time:                                           02:18     Electronically signed by: Jose De Jesus Rdz MD  Date:                                            11/03/2022  Time:                                           02:54           Exam Ended: 11/03/22 01:35 Last Resulted: 11/03/22 02:54           CT Head Without Contrast  Order: [id number]   Status: Final result      Visible to patient: Yes (not seen)      Next appt: None      0 Result Notes  Details    Reading Physician Reading Date Result Priority   Ravi Lord MD  [phone]  [phone] 11/2/2022 STAT   Saturnino Lindsay MD  [phone]  [phone] 11/2/2022      Narrative & Impression  EXAMINATION:  CT HEAD WITHOUT CONTRAST     CLINICAL HISTORY:  Subarachnoid hemorrhage (SAH) suspected;Stroke, follow up;f/u [facility];     TECHNIQUE:  Low dose axial CT images obtained throughout the head without the use of intravenous contrast.  Axial, sagittal and coronal reconstructions were performed.     COMPARISON:  None     FINDINGS:  The brain parenchyma demonstrates small amount of subarachnoid hemorrhage within the inferior aspect of the right frontal lobe and anterior aspect of the right temporal lobe.     Mild patchy hypoattenuation in the supratentorial white matter, nonspecific but could reflect chronic microvascular ischemic changes.     No acute major vascular distribution infarct.  No mass effect or midline  shift.     Ventricles are prominent in size.  No hydrocephalus.     No extra-axial blood or fluid collections are identified.     The sellar region and craniocervical junction are unremarkable.     Orbits are without significant abnormality.     There is soft tissue prominence overlying the right frontal and supraorbital calvarium extending to the right periorbital region suggesting soft tissue swelling/contusion.  No displaced calvarial fracture.     Mastoid air cells and paranasal sinuses are essentially clear.     Impression:     Small amount of subarachnoid hemorrhage within right inferior frontal lobe and right anterior temporal lobe.  No midline shift or acute large vascular territory infarct.     Few additional findings as above.     This report was flagged in Epic as abnormal.     Electronically signed by resident: Saturnino Lindsay  Date:                                            11/02/2022  Time:                                           19:07     Electronically signed by: Ravi Lord MD  Date:                                            11/02/2022  Time:                                           19:22           Exam Ended: 11/02/22 18:59 Last Resulted: 11/02/22 19:22           CT Cervical Spine Without Contrast  Order: [id number]   Status: Final result      Visible to patient: Yes (not seen)      Next appt: None      0 Result Notes  Details    Reading Physician Reading Date Result Priority   Dea Martin MD  [phone]  [phone] 11/2/2022 STAT     Narrative & Impression  EXAMINATION:  CT CERVICAL SPINE WITHOUT CONTRAST     CLINICAL HISTORY:  Neck trauma, midline tenderness (Age 16-64y);     TECHNIQUE:  Low dose axial images, sagittal and coronal reformations were performed though the cervical spine.  Contrast was not administered.     COMPARISON:  None     FINDINGS:  The alignment the cervical is normal.  The vertebral body heights are well maintained severe disc space narrowing at C2-3, C4-5 and  C5-6 consistent with degenerative disc disease     No fracture, no osseous lesion seen     Cerumen impacted right external ear canal.  The left external ear canal is not included in the field of view.     The C1 arch is intact.     The atlantooccipital joints are intact.     The bilateral lateral masses of C1-2 are intact.     C2-C3: Minimal posterior disc osteophyte, no canal stenosis or foraminal narrowing.  Mild right facet joint osseous hypertrophy.     C3-C4: No apparent canal stenosis or foraminal narrowing.     C4-C5: No apparent canal stenosis or foraminal narrowing. Chronic appearing air containing (vacuo phenomenon) cystic changes within the C5 vertebrae.     C5-C6: Mild posterior disc osteophyte complex, no canal stenosis or foraminal narrowing.     C6-C7: Mild disc bulge, small right paracentral disc protrusion, no apparent canal stenosis or foraminal narrowing.     C7-T1: Unremarkable     The upper thoracic spine appears normal.  The upper lung zones appear normal.  The visualized paravertebral soft tissues appear normal.     Impression:     No acute fractures identified.     Spondylosis of the cervical spine as detailed above, no apparent canal stenosis or foraminal narrowing.     Cerumen impacted right external ear canal        Electronically signed by: Dea Martin MD  Date:                                            11/02/2022  Time:                                           16:10           Exam Ended: 11/02/22 15:13 Last Resulted: 11/02/22 16:10             Cardiology:  Transthoracic echo (TTE) complete  Summary     The left ventricle is normal in size with concentric remodeling and normal systolic function. The estimated ejection fraction is 60%.   Normal left ventricular diastolic function.   Normal right ventricular size with normal right ventricular systolic function.   Mild mitral regurgitation.   Normal central venous pressure (3 mmHg).       Laboratory:  Lab Results   Component  Value Date    HGBA1C 5.5 11/02/2022    CHOL 142 11/02/2022    HDL 48 11/02/2022    LDLCALC 83.0 11/02/2022    TRIG 55 11/02/2022    TSH 19.803 (H) 11/02/2022     Recent Lab Results  (Last 5 results in the past 72 hours)      11/04/22  0519   11/04/22  0047   11/04/22  0047   11/03/22  1753   11/03/22  1750        Albumin     3.3           Alkaline Phosphatase     54           ALT     37           Anion Gap     11           AST     62           Baso #     0.06           Basophil %     1.4           BILIRUBIN TOTAL     0.9  Comment: For infants and newborns, interpretation of results should be based  on gestational age, weight and in agreement with clinical  observations.    Premature Infant recommended reference ranges:  Up to 24 hours.............<8.0 mg/dL  Up to 48 hours............<12.0 mg/dL  3-5 days..................<15.0 mg/dL  6-29 days.................<15.0 mg/dL             BUN     9           Calcium     8.5           Chloride     111           CO2     19           Creatinine     0.7           Differential Method     Automated           eGFR     >60.0           Eos #     0.0           Eosinophil %     0.5           Glucose     95           Gran # (ANC)     2.6           Gran %     61.6           Hematocrit     38.5           Hemoglobin     12.4           Immature Grans (Abs)     0.01  Comment: Mild elevation in immature granulocytes is non specific and   can be seen in a variety of conditions including stress response,   acute inflammation, trauma and pregnancy. Correlation with other   laboratory and clinical findings is essential.             Immature Granulocytes     0.2           Lymph #     1.2           Lymph %     27.9           Magnesium     1.9           MCH     33.4           MCHC     32.2           MCV     104           Mono #     0.4           Mono %     8.4           MPV     10.4           nRBC     0           Phosphorus     3.5           Platelets     234           POCT Glucose   99      104   70       Potassium     3.7           PROTEIN TOTAL     6.6           RBC     3.71           RDW     13.8           Sodium 142     141           Thyroperoxidase Antibodies     285.5           WBC     4.16                              Consultations:  IP CONSULT TO NEUROSURGERY  IP CONSULT TO NEURO CRITICAL CARE  IP CONSULT TO PHYSICAL MEDICINE REHAB  IP CONSULT TO REGISTERED DIETITIAN/NUTRITIONIST  IP CONSULT TO SOCIAL WORK/CASE MANAGEMENT  WOUND CARE CONSULT  IP CONSULT TO ENDOCRINOLOGY    Medications:      Medication List      START taking these medications    levETIRAcetam 250 MG Tab  Commonly known as: KEPPRA  Take 1 tablet (250 mg total) by mouth every 12 (twelve) hours.     levothyroxine 50 MCG tablet  Commonly known as: SYNTHROID  Take 1 tablet (50 mcg total) by mouth before breakfast.  Start taking on: November 5, 2022        CHANGE how you take these medications    aspirin 81 MG EC tablet  Commonly known as: ECOTRIN  Take 2 tablets (162 mg total) by mouth once daily. PLEASE HOLD, DO NOT TAKE UNTIL FOLLOW UP WITH [facility] IN 2 WEEKS  What changed: additional instructions  Notes to patient: PLEASE HOLD, DO NOT TAKE UNTIL FOLLOW UP WITH [facility] IN 2 WEEKS        CONTINUE taking these medications    acetaminophen 160 mg/5 mL Liqd  Commonly known as: TYLENOL  Take 10.2 mLs (326.4 mg total) by mouth every 6 (six) hours as needed (PAIN).     atorvastatin 40 MG tablet  Commonly known as: LIPITOR  Take 1 tablet (40 mg total) by mouth once daily.     budesonide 3 mg capsule  Commonly known as: ENTOCORT EC     calcium carbonate-vitamin D3 500 mg-10 mcg (400 unit) Chew     * cyanocobalamin 250 MCG tablet  Commonly known as: VITAMIN B-12     * VITAMIN B-12 250 MCG tablet  Generic drug: cyanocobalamin     montelukast 10 mg tablet  Commonly known as: SINGULAIR  Take 1 tablet (10 mg total) by mouth every evening.     mupirocin 2 % ointment  Commonly known as: BACTROBAN  Apply topically 3  (three) times daily.     omeprazole 40 MG capsule  Commonly known as: PRILOSEC     ondansetron 4 MG Tbdl  Commonly known as: ZOFRAN-ODT  Take 1 tablet (4 mg total) by mouth every 8 (eight) hours as needed.         * This list has 2 medication(s) that are the same as other medications prescribed for you. Read the directions carefully, and ask your doctor or other care provider to review them with you.               Where to Get Your Medications      These medications were sent to [facility]  [address] [place] [facility] [address]    Hours: Mon-Fri 7a-7p, Sat-Sun 10a-4p Phone: [phone]   · levETIRAcetam 250 MG Tab  · levothyroxine 50 MCG tablet     Information about where to get these medications is not yet available    Ask your nurse or doctor about these medications  · aspirin 81 MG EC tablet             Diet: Normal    Activity: As tolerated.     Disposition: Discharged to group home in fair condition.    Follow Up Plan:  Follow up with Neurosurgery in 2 weeks ; outpatient MRI recommended  Follow up with neurologist Dr. Ricketts; caregivers plan to set up follow up  Follow up with PCP    This discharge took 45 minutes to complete.    Shahzad Quispe MD  Neurocritical Care  [facility]

## 2022-11-04 NOTE — PROGRESS NOTES
11/04/22 1000   WOCN Assessment   WOCN Total Time (mins) 10   Visit Date 11/04/22   Visit Time 1000   Consult Type New   WOCN Speciality Wound   Intervention assessed;changed;applied;chart review;coordination of care;orders        Altered Skin Integrity 11/02/22 2300 Sacral spine Partial thickness tissue loss. Shallow open ulcer with a red or pink wound bed, without slough. Intact or Open/Ruptured Serum-filled blister.   Date First Assessed/Time First Assessed: 11/02/22 2300   Altered Skin Integrity Present on Admission: yes  Location: Sacral spine  Description of Altered Skin Integrity: Partial thickness tissue loss. Shallow open ulcer with a red or pink wound bed, w...   Wound Image    Drainage Amount None   Red (%), Wound Tissue Color 100 %   Wound Length (cm) 3 cm   Wound Width (cm) 3 cm   Wound Depth (cm) 0.1 cm   Wound Volume (cm^3) 0.9 cm^3   Wound Surface Area (cm^2) 9 cm^2   [facility]  Neuro Critical Care  Wound Care    Patient Name:  Anabella Potter   MRN:  [id number]  Date: 11/4/2022  Diagnosis: SAH (subarachnoid hemorrhage)    History:     Past Medical History:   Diagnosis Date    Anorexia     Trisomy        Social History     Socioeconomic History    Marital status: Single   Occupational History    Occupation: disabled   Tobacco Use    Smoking status: Never    Smokeless tobacco: Never   Substance and Sexual Activity    Alcohol use: Never    Drug use: Never    Sexual activity: Never       Precautions:     Allergies as of 11/02/2022    (No Known Allergies)       [facility] Assessment Details/Treatment   Patient consult for wound care to sacral area. This site is raise and red without any skin opening.the treatment is to apply a foam sacral border every third day and prn.     (Insert flowsheet data here)    Recommendations made to primary team for the above Orders placed.     11/04/2022

## 2022-11-04 NOTE — NURSING
A few of leads noted to not be in place. Ok to remove for CT per EDWIN Solis at this time. cEEG now off. WCTM

## 2022-11-04 NOTE — PLAN OF CARE
Problem: Adult Inpatient Plan of Care  Goal: Plan of Care Review  Outcome: Ongoing, Progressing  Goal: Optimal Comfort and Wellbeing  Outcome: Ongoing, Progressing   [facility] Care Plan    POC reviewed with Anabella Potter and family at 0300. Pt unable to verbalize understanding. Questions and concerns addressed caretakers at bedside.No acute events overnight. Pt progressing toward goals. Will continue to monitor. See below and flowsheets for full assessment and VS info.   -CT complete  - NS @75ml/hr        Is this a stroke patient? yes- Stroke booklet reviewed with patient and family, risk factors identified for patient and stroke booklet remains at bedside for ongoing education.     Neuro:  Shaun Coma Scale  Best Eye Response: 4-->(E4) spontaneous  Best Motor Response: 5-->(M5) localizes pain  Best Verbal Response: 2-->(V2) incomprehensible speech  Monson Coma Scale Score: 11  Assessment Qualifiers: patient not sedated/intubated  Pupil PERRLA: yes     24hr Temp:  [97.6 °F (36.4 °C)-98.9 °F (37.2 °C)]     CV:   Rhythm: normal sinus rhythm  BP goals:   SBP < 140  MAP > 65    Resp:   O2 Device (Oxygen Therapy): room air       Plan: N/A    GI/:     Diet/Nutrition Received: regular  Last Bowel Movement: 11/04/22  Voiding Characteristics: incontinence    Intake/Output Summary (Last 24 hours) at 11/4/2022 0528  Last data filed at 11/3/2022 1801  Gross per 24 hour   Intake 535.55 ml   Output --   Net 535.55 ml     Unmeasured Output  Urine Occurrence: 1  Stool Occurrence: 1  Pad Count: 1    Labs/Accuchecks:  Recent Labs   Lab 11/04/22  0047   WBC 4.16   RBC 3.71*   HGB 12.4   HCT 38.5         Recent Labs   Lab 11/04/22  0047      K 3.7   CO2 19*   *   BUN 9   CREATININE 0.7   ALKPHOS 54*   ALT 37   AST 62*   BILITOT 0.9      Recent Labs   Lab 11/02/22  1533   INR 1.0    No results for input(s): CPK, CPKMB, TROPONINI, MB in the last 168 hours.    Electrolytes: N/A - electrolytes WDL  Accuchecks:  Q6H    Gtts:      LDA/Wounds:  Lines/Drains/Airways       Peripheral Intravenous Line  Duration                  Peripheral IV - Single Lumen 11/02/22 2147 22 G Left Hand 1 day         Peripheral IV - Single Lumen 11/04/22 0524 20 G Posterior;Right Wrist <1 day                  Wounds: Yes  Wound care consulted: Yes

## 2022-11-04 NOTE — CONSULTS
[facility]  Adult Nutrition  Consult Note    SUMMARY     Recommendations    1. Continue current lactose restricted diet- encourage adequate PO intake.   2. Add Boost Plus TID- suitable for lactose intolerance.   3. RD following.    Goals: Will meet % EEN/EPN by next RD f/u.  Nutrition Goal Status: new  Communication of RD Recs:  (POC)    Assessment and Plan    Nutrition Problem  Inadequate protein/energy intake    Related to (etiology):   Decreased appetite     Signs and Symptoms (as evidenced by):   PO intake 25-50%    Interventions/Recommendations (treatment strategy):  Collaboration of nutrition care with other providers  EN    Nutrition Diagnosis Status:   New     Malnutrition Assessment    Orbital Region (Subcutaneous Fat Loss): mild depletion  Upper Arm Region (Subcutaneous Fat Loss): mild depletion   [place] Region (Muscle Loss): mild depletion  Clavicle and Acromion Bone Region (Muscle Loss): mild depletion  Dorsal Hand (Muscle Loss): mild depletion     Reason for Assessment    Reason For Assessment: consult  Diagnosis:  (SAH)  Relevant Medical History: Intellectual delay 2/2 down syndrome, triosmy  Interdisciplinary Rounds: did not attend  General Information Comments: RD consulted to assess dietary needs. Spoke with caregivers at bedside. States great appetite PTA with 3 meals/day + snack inbetween on lactose-free diet. States they are going to be adding Ripple protein shake to diet regimen once discharged. State 25-50% PO intake currently with appetite that is improving. No issues with n/v/d/c/chewing/swallowing. UBW 68#. UBW per chart review 70-75# and has been mainly stable x 11 months. See wt trend below. CBW 70#. Visual NFPE completed 11/4 and found mild fat and muscle wasting. Unable to dxn with malnutrition at this time. LBM 11/4.    Wt Readings from Last 4 Encounters:   11/04/22 31.8 kg (70 lb 1.7 oz)   06/06/22 30.8 kg (68 lb)   01/26/22 33.3 kg (73 lb 6.4 oz)   01/21/22  "34.1 kg (75 lb 1.6 oz)     Nutrition Discharge Planning: Adequate PO intake    Nutrition Risk Screen    Nutrition Risk Screen: no indicators present    Nutrition/Diet History    Patient Reported Diet/Restrictions/Preferences: lactose restricted  Spiritual, Cultural Beliefs, [employer] Practices, Values that Affect Care: no  Factors Affecting Nutritional Intake: decreased appetite    Anthropometrics    Temp: 98.2 °F (36.8 °C)  Height: 4' 6" (137.2 cm)  Height (inches): 54 in  Weight Method: Bed Scale  Weight: 31.8 kg (70 lb 1.7 oz)  Weight (lb): 70.11 lb  Ideal Body Weight (IBW), Female: 70 lb  % Ideal Body Weight, Female (lb): 100.16 %  BMI (Calculated): 16.9  BMI Grade: 16 - 16.9 protein-energy malnutrition grade II     Lab/Procedures/Meds    Pertinent Labs Reviewed: reviewed  Pertinent Labs Comments: Albumin 3.3, Ca 8.5, AST 62, Al Phos 54, Chloride 111  Pertinent Medications Reviewed: reviewed  Pertinent Medications Comments: atorvastatin, levothyroxine    Estimated/Assessed Needs    Weight Used For Calorie Calculations: 31.8 kg (70 lb 1.7 oz)  Energy Calorie Requirements (kcal): 1124 kcals  Energy Need Method: Claring[place]-St Jeor (MSJ x 1.5 PAL)  Protein Requirements: 50 g (1.5 g/kg)  Weight Used For Protein Calculations: 31.8 kg (70 lb 1.7 oz)  Fluid Requirements (mL): 1 ml or fluid per MD  Estimated Fluid Requirement Method: RDA Method  RDA Method (mL): 1124     Nutrition Prescription Ordered    Current Diet Order: Regular    Evaluation of Received Nutrient/Fluid Intake    Energy Calories Required: not meeting needs  Protein Required: not meeting needs  Fluid Required: not meeting needs  Tolerance: tolerating  % Intake of Estimated Energy Needs: 25 - 50 %  % Meal Intake: 25 - 50 %    Nutrition Risk    Level of Risk/Frequency of Follow-up:  (1 time/week)     Monitor and Evaluation    Food and Nutrient Intake: energy intake, food and beverage intake  Food and Nutrient Adminstration: diet " order  Knowledge/Beliefs/Attitudes: food and nutrition knowledge/skill, beliefs and attitudes  Physical Activity and Function: nutrition-related ADLs and IADLs  Anthropometric Measurements: height/length, weight, body mass index, weight change  Biochemical Data, Medical Tests and Procedures: electrolyte and renal panel, gastrointestinal profile, inflammatory profile, glucose/endocrine profile, lipid profile  Nutrition-Focused Physical Findings: overall appearance     Nutrition Follow-Up    RD Follow-up?: Yes    Roxane LESTER

## 2022-11-04 NOTE — CONSULTS
Epilepsy Team    CC: EEG placed for concern for epileptiform activity/seizures    HPI: Ms. Potter is a 52  year old woman with PMHx of Down Syndrome admitted for management of bifrontal SAH after event of seizure vs syncope and fall at assisted living facility. Patient's caregivers reports she had acute contractions of her bilateral upper extremities, fell to the ground with trauma to the right orbital area, and had generalized convulsions. Afterwards, patient noted to be very lethargic and snoring. CTH on ER presentation revealed bifrontal SAH.     Unable to obtain patient's family and social history due to patient mental status.     Patient is currently maintained on Levetiracetam 250 mg BID.      Review of systems:  Not performed secondary to patient mental status    Physical Exam  General:  Afebrile, alert, comfortable, no acute distress.   HEENT: Normocephalic. Right periorbital ecchymosis.  Pulmonary: Effort normal, no respiratory distress.  Cardiac: Normal rate. No JVD.   Abdominal: Soft, non-distended.  Skin: No jaundice, rashes.  Psych: At baseline per caregivers  Neuro:  CN:   ROSA OU   VOR intact OU   Corneal intact OU   + spontaneous eye opening   Moves all extremities spontaneously and symmetrically  Oriented to self    Exam findings to suggest seizures:  Myoclonus - no   eye twitching - no   Nystagmus - no   gaze deviation - no   waxy rigidity - no      EEG reviewed by Epileptologist, findings include mild generalized non-specific cerebral dysfunction with no electrographic seizures or indications of seizure tendency.     Recommendations: Encephalopathy is nonspecific in regards to etiology. EEG removed overnight for imaging, will leave off. Findings and recommendations discussed with primary team.      SAH (subarachnoid hemorrhage)  - s/p fall during suspected seizure at assisted living facility  - NSGY consulted and following  - Repeat CTH completed overnight     Primary Hypothyroidism  - Noted  abnormal TFTs on labs during admission  - Endocrinology consulted, initiated Levothyroxine 50 mcg daily  - .5   - Management per NCC, Endocrine    Intellectual Delay  Down Syndrome  - At baseline 11/4 per caregivers at bedside    EEG removed overnight, we will sign off. Please do not hesitate to call us back with any questions or concerns.      Anette Mauricio PA-C  Neurology Epilepsy  Staff: Dr. Strauss

## 2022-11-04 NOTE — PROCEDURES
EEG REPORT      Anabella Potter  [id number]  1970    DATE OF SERVICE: 11/3/2022     -1    METHODOLOGY      Extended electroencephalographic recording is made while the patient is ambulatory and continuing normal daily activities.  Electrodes are placed according to the International 10-20 placement system and included T1 and T2 electrode placement.  Twenty four (24) channels of digital signal (sampling rate of 512/sec) was simultaneously recorded from the scalp including EKG and eye monitors.  Recording band pass was 0.1 to 100 hz and all data was stored digitally on the recorder.  The patient is instructed to press an event button when clinical symptoms occur and write the symptoms into a diary. Activation procedures which include photic stimulation, hyperventilation and instructing patients to perform simple task are done in selected patients.        The EEG is displayed on a monitor screen and can be reformatted into different montages for evaluation.  The entire recoding is submitted for computer assisted analysis to detect spike and electrographic seizure activity.  The entire recording is visually reviewed and the times identified by computer analysis as being spikes or seizures are reviewed again.  Compresses spectral analysis (CSA) is also performed on the activity recorded from each individual channel.  This is displayed as a power display of frequencies from 0 to 30 Hz over time.   The CSA analysis is done and displayed continuously.  This is reviewed for asymmetries in power between homologous areas of the scalp and for presence of changes in power which canbe seen when seizures occur.  Sections of suspected abnormalities on the CSA is then compared with the original EEG recording.  .     Screenburn software was also utilized in the review of this study.  This software suite analyzes the EEG recording in multiple domains.  Coherence and rhythmicity is computed to identify EEG sections which may  contain organized seizures.  Each channel undergoes analysis to detect presence of spike and sharp waves which have special and morphological characteristic of epileptic activity.  The routine EEG recording is converted from spacial into frequency domain.  This is then displayed comparing homologous areas to identify areas of significant asymmetry.  Algorithm to identify non-cortically generated artifact is used to separate eye movement, EMG and other artifact from the EEG     Recording Times  Start on 11/3/2022  Stop on 11/4/2022    A total of 16:40:47 hours of EEG was recorded.      EEG FINDINGS:  Background activity:   There is a poorly organized mixed frequency background with absent posterior dominant rhythm   Symmetry and continuity: the background was continuous and symmetric     Sleep:   No sleep transients although there is cycling of the background consistent with state change.    Activation procedures:   NA    Abnormal activity:   No epileptiform discharges, periodic discharges, lateralized rhythmic delta activity or electrographic seizures were seen.    IMPRESSION:   Abnormal EEG due to mild generalized non-specific cerebral dysfunction with no electrographic seizures or indications of seizure tendency.      Obed Strauss MD  Neurology-Epilepsy.  [facility].

## 2022-11-04 NOTE — PT/OT/SLP EVAL
Speech Language Pathology Evaluation  Bedside Swallow  Discharge    Patient Name:  Anabella Potter   MRN:  [id number]  Admitting Diagnosis: Subarachnoid hemorrhage    Recommendations:                 General Recommendations:  Follow-up not indicated  Diet recommendations:  Regular, Thin   Aspiration Precautions: Small bites/sips and Standard aspiration precautions   General Precautions: Standard, fall, seizure  Communication strategies:  none    History:     Past Medical History:   Diagnosis Date    Anorexia     Trisomy        No past surgical history on file.    Social History: Patient lives in an assisted living facility    Prior Intubation HX:  none this admit    Prior diet: regular/thin.      Subjective     Awake/alert  Pain/Comfort:  Pain Rating 1: 0/10  Pain Rating Post-Intervention 1: 0/10    Respiratory Status: Room air    Objective:     Oral Musculature Evaluation  Oral Musculature: unable to assess due to poor participation/comprehension  Dentition: scattered dentition  Voice Prior to PO Intake: clear    Bedside Swallow Eval:   Consistencies Assessed:  Thin liquids x6 cup  Solids x3 sausage      Oral Phase:   WFL    Pharyngeal Phase:   no overt clinical signs/symptoms of aspiration    Assessment:     Anabella Potter is a 52 y.o. female with oropharyngeal swallow deemed WFL. No further ST warranted at this time. Cognitive linguistic aspects at baseline per family.     Goals:   Multidisciplinary Problems       SLP Goals       Not on file                    Plan:       Plan of Care reviewed with:  patient, caregiver   SLP Follow-Up:  No       Discharge recommendations:    return to assisted living       Time Tracking:     SLP Treatment Date:   11/04/22  Speech Start Time:  0908  Speech Stop Time:  0918     Speech Total Time (min):  10 min    Billable Minutes: Eval Swallow and Oral Function 10    11/04/2022

## 2022-11-04 NOTE — PLAN OF CARE
Recommendations     1. Continue current lactose restricted diet- encourage adequate PO intake.   2. Add Boost Plus TID- suitable for lactose intolerance.   3. RD following.     Goals: Will meet % EEN/EPN by next RD f/u.  Nutrition Goal Status: new  Communication of RD Recs:  (POC)    Roxane Lopez PL-LDN

## 2022-11-04 NOTE — PLAN OF CARE
[facility]  Discharge Final Note    Primary Care Provider: Paulino Edmonds MD    Expected Discharge Date: 11/15/2022    SW advised by MD team that Pt is ready to dc today. Met with Group Home Staff; (Cece Szymanski) - Karin [phone] and Shirley Jeansonne- [phone] at bedside to discuss. They reported they will take the Pt home and would appreciate bedside delivery of meds. They have already been in contact with Neurosurgery clinic and follow up is set up. Advised RN and MD team of the above.     Final Discharge Note (most recent)       Final Note - 11/04/22 1526          Final Note    Assessment Type Final Discharge Note     Anticipated Discharge Disposition Another Health Care Institution Not Defined   [facility]    What phone number can be called within the next 1-3 days to see how you are doing after discharge? [phone]   Group Home Staff; (Cece Szymanski) - Karin [phone] and Shirley Jeansonne- [phone]    Hospital Resources/Appts/Education Provided Appointments scheduled and added to AVS                     Important Message from Medicare             Contact Info       [facility]th [facility]   Specialty: Neurosurgery    [address]  [facility] [address]   Phone: [phone]       Next Steps: Follow up on 11/22/2022    Instructions: for hospital follow up with Neurosurgery          Gina Berg LCSW  Neurocritical Care   [facility]  [id number]

## 2022-11-04 NOTE — ASSESSMENT & PLAN NOTE
53 y/o female with Down syndrome with frontal subarachnoid hemorrhage following syncopal fall 11/2/2022 morning.  CTH on arrival to [facility] stable from OSH imaging, consistent with traumatic hemorrhage. CTA stable but extensive assessment limited by motion abnormalities    - Na goals remain Eunatremia per Neuro ICU  - Keppra 250 mg BID, to follow up with Neurologist in [facility]: Dr. Yap  - Neurosurgery following, appreciate recs, outpatient follow up planned  - Hold ASA, until outpatient follow up with neurosurgery  - SBP goal 100-140   - EEG did not reveal epileptiform seizures  - Hourly neuro checks   - PT/OT/SLP

## 2022-11-04 NOTE — PLAN OF CARE
11/04/22 1128   Post-Acute Status   Post-Acute Authorization Other   Other Status See Comments  (return to group home at [facility])     Gina Berg LCSW  Neurocritical Care   [facility]  [id number]

## 2022-11-04 NOTE — NURSING
Patient being discharged to  group home  per orders.     Discharge instructions and AVS reviewed with patient caregivers (Karin & Mary). Patient caregivers verbalized understanding. All questions addressed. LDAs removed.     Patient transported via wheelchair to ride home with belongings, stroke booklet, and prescriptions.

## 2022-11-04 NOTE — PT/OT/SLP EVAL
Occupational Therapy   Co-Evaluation/Treatment and Discharge Note    Name: Anabella Potter  MRN: [id number]  Admitting Diagnosis:  SAH (subarachnoid hemorrhage)   Recent Surgery: * No surgery found *      Recommendations:     Discharge Recommendations: other (see comments) (Return to living facility)  Discharge Equipment Recommendations:  none  Barriers to discharge:  None    Assessment:     Anabella Potter is a 52 y.o. female with a medical diagnosis of SAH (subarachnoid hemorrhage). At this time, patient is functioning at their prior level of function and does not require further acute OT services. Pt currently resides in an assisted living facility with support from staff. She requires assist at baseline for ADLs and is able to ambulate with supervision. Pt's caregivers report that she is at her functional baseline.     Plan:     During this hospitalization, patient does not require further acute OT services.  Please re-consult if situation changes.    Plan of Care Reviewed with: patient, caregiver    Subjective     Pt with no purposeful speech     Chief Complaint: none stated   Patient/Family Comments/goals: To return to the assisted living facility     Occupational Profile:  Living Environment: Pt resides in an assisted living facility   Previous level of function: Requires assistance with some ADLs, but can feed herself. She ambulates with supervision and no AD.   Equipment Used at home:  none  Assistance upon Discharge: Staff at facility     Pain/Comfort:  Pain Rating 1: 0/10    Patients cultural, spiritual, Confucianist conflicts given the current situation: no    Objective:     Co-evaluation/treatment performed due to patient's multiple deficits requiring two skilled therapists to appropriately and safely assess patient's strength and endurance while facilitating functional tasks in addition to accommodating for patient's activity tolerance.     Communicated with: RN prior to session.  Patient found HOB elevated  with telemetry, pulse ox (continuous) upon OT entry to room.    General Precautions: Standard, fall, seizure   Orthopedic Precautions:N/A   Braces: N/A  Respiratory Status: Room air     Occupational Performance:    Bed Mobility:    Pt found sitting EOB   Pt with Total A to return to bed via family member due to Pt resisting     Functional Mobility/Transfers:  Patient completed Sit <> Stand Transfer with stand by assistance  with  no assistive device   Patient completed Toilet Transfer Step Transfer technique with stand by assistance with  no AD  Functional Mobility: Pt engaging in functional mobility to simulate household/community distances with close SBA and utilizing no AD in order to maximize functional activity tolerance and standing balance required for engagement in occupations of choice.    Activities of Daily Living:  Upper Body Dressing: stand by assistance : To doff gown, Max A to don gown back on with encouragement provided   Toileting: moderate assistance : For a void on the toilet. Pt able to manage underwear, but required assistance for anterior hygiene.     Cognitive/Visual Perceptual:  Cognitive/Psychosocial Skills:     -       Oriented to: none   -       Follows Commands/attention:Easily distracted and Follows one-step commands ~10% of the time  -       Communication: impaired  -       Memory: Impaired STM, Impaired LTM, and Poor immediate recall  -       Safety awareness/insight to disability: impaired   -       Mood/Affect/Coping skills/emotional control: Restless  Visual/Perceptual:      -Appears intact, R eye bruised and swollen shut    Physical Exam:   Balance:  Static Sitting   stand by assistance   Dynamic Sitting   stand by assistance   Static Standing   stand by assistance   Dynamic Standing   stand by assistance     Upper Extremity Function:   Dominance: Right  Left UE Right UE   UE Edema None noted None noted   UE ROM WFL WFL   UE Strength WFL WFL    Strength WFL WFL   Sensation    -        Intact    -       Intact   Fine Motor Skills:     -       Intact    -       Intact   Gross Motor Skills:   WFL   WFL         AMPAC 6 Click ADL:  AMPAC Total Score: 18    Treatment & Education:  Therapist provided facilitation and instruction of proper body mechanics and fall prevention strategies during tasks listed above.  Instructed patient to sit in bedside chair daily to increase OOB/activity tolerance.  Instructed patient to use call light to have nursing staff assist with needs/transfers.  Discussed OT POC and answered all questions within OT scope of practice.  Whiteboard updated       Patient left HOB elevated with all lines intact, call button in reach, bed alarm on, and RN and family present    GOALS:   Multidisciplinary Problems       Occupational Therapy Goals       Not on file              Multidisciplinary Problems (Resolved)          Problem: Occupational Therapy    Goal Priority Disciplines Outcome Interventions   Occupational Therapy Goal   (Resolved)     OT, PT/OT Met    Description: Pt does not require skilled OT services at this time. Pt is performing all ADLs and functional mobility at [facility]. Please re-consult if any changes.                            History:     Past Medical History:   Diagnosis Date    Anorexia     Trisomy        No past surgical history on file.    Time Tracking:     OT Date of Treatment: 11/04/22  OT Start Time: 0958  OT Stop Time: 1015  OT Total Time (min): 17 min    Billable Minutes:Evaluation 7  Self Care/Home Management 10    11/4/2022

## 2022-11-04 NOTE — ASSESSMENT & PLAN NOTE
Syncopal fall with reported convulsion at her living facility  - Loaded with Keppra 1.5g  - EEG, did not reveal epileptiform seizures  - Echo, normal EF 60%, but with mitral regurgitation  - EKG  - Hourly neuro checks

## 2022-11-04 NOTE — PLAN OF CARE
Problem: Occupational Therapy  Goal: Occupational Therapy Goal    Description: Pt does not require skilled OT services at this time. Pt is performing all ADLs and functional mobility at [facility]. Please re-consult if any changes.       Outcome: Met

## 2022-11-04 NOTE — SUBJECTIVE & OBJECTIVE
Interval History:  Patient remains stable, no epileptiform seizures noted on EEG. Decision was made to discharge with continued keppra (though not explicitly recommended by epilepsy), with plans for outpatient follow up with patient's neurologist Dr. Yap who can make that determination.    Review of Systems   Reason unable to perform ROS: Pt with severe intellectual disability.   Constitutional:  Negative for fever.   Respiratory:  Negative for cough.    Gastrointestinal:  Negative for vomiting.   Neurological:  Positive for seizures.     Objective:     Vitals:  Temp: 98.5 °F (36.9 °C)  Pulse: 68  Rhythm: normal sinus rhythm  BP: (!) 90/55  MAP (mmHg): 68  Resp: 19  SpO2: 96 %  O2 Device (Oxygen Therapy): room air    Temp  Min: 98.1 °F (36.7 °C)  Max: 98.6 °F (37 °C)  Pulse  Min: 56  Max: 97  BP  Min: 90/55  Max: 123/58  MAP (mmHg)  Min: 68  Max: 88  Resp  Min: 10  Max: 29  SpO2  Min: 95 %  Max: 98 %    11/03 0701 - 11/04 0700  In: 535.6 [I.V.:535.6]  Out: -    Unmeasured Output  Urine Occurrence: 1  Stool Occurrence: 1  Pad Count: 1       Physical Exam  Vitals reviewed.   HENT:      Mouth/Throat:      Mouth: Mucous membranes are moist.   Eyes:      Comments: R periorbital haematoma   Cardiovascular:      Rate and Rhythm: Normal rate.   Musculoskeletal:      Cervical back: Neck supple.   Neurological:      Mental Status: She is alert.     Neurological Exam:  MENTAL STATUS  Level of consciousness: alert  Orientation: oriented to person, disoriented to place, and time  Attention reduced. Concentration reduced  Speech: normal    CRANIAL NERVES  CN II: Visual fields full to confrontation  CN III, IV, VI: PERRL, EOMI  CN V: Facial sensation intact  CN VII: Facial expression symmetric and full  CN VIII: Hearing intact to finger rub  CN IX, X: Symmetric palate elevation. Phonation normal  CN XI: Shoulder shrug and head turn intact bilaterally  CN XII: Tongue midline with normal movements, no atrophy    MOTOR  EXAM  Muscle bulk: reduced  Muscle tone: difficult to assess as patient resists attempts at passive movement    Strength - Upper Extremities   Arm abduction Elbow flexion Elbow extension Wrist flexion Wrist extension   Right 4/5 4/5 4/5 4/5 4/5   Left 4/5 4/5 4/5 4/5 4/5     Strength - Lower Extremities   Hip flexion Knee flexion Knee extension Dorsiflexion Plantarflexion   Right 4/5 4/5 4/5 4/5 4/5   Left 4/5 4/5 4/5 4/5 4/5       REFLEXES   Biceps Triceps Brachioradialis Patellar Achilles   Right +2 +2 +2 +1 +2   Left +2 +2 +2 +1 +2     SENSORY EXAM  Light touch: intact in all 4 extremities    COORDINATION  Unable to formally assess 2/2 lack of cooperation, but UE coordination appears to be intact    Medications:  Continuous   Scheduledatorvastatin, 40 mg, Daily  levETIRAcetam, 250 mg, Q12H  levothyroxine, 50 mcg, Before breakfast  OLANZapine, 2.5 mg, Daily  PRNacetaminophen, 650 mg, Q6H PRN  labetalol, 10 mg, Q4H PRN  ondansetron, 4 mg, Q8H PRN  sodium chloride 0.9%, 10 mL, PRN  sodium chloride 0.9% infusion, 500 mL, Once PRN    Today I personally reviewed pertinent medications, lines/drains/airways, imaging, cardiology results, laboratory results, microbiology results, notably:    Diet  Diet Lactose Restricted  Diet Lactose Restricted

## 2022-11-04 NOTE — ASSESSMENT & PLAN NOTE
Newfound hypothyroid labs (TSH 19.8, fT4 slightly low at 0.65).    -- endocrinology consulted, greatly appreciate recs  -- levothyroxine 50 mcg QD  -- per endocrinology, pt clinically euthyroid, symptoms history limited by cognitive impairment  -- TPO Ab ordered by endocrinology was elevated, suspected Hashimotos  -- Recommending repeat TSH in 4-6 weeks by PCP

## 2022-11-04 NOTE — PLAN OF CARE
Problem: Adult Inpatient Plan of Care  Goal: Plan of Care Review  11/4/2022 1748 by Lisa Lord RN  Outcome: Adequate for Care Transition  11/4/2022 1620 by Lisa Lord RN  Outcome: Ongoing, Progressing  Goal: Patient-Specific Goal (Individualized)  11/4/2022 1748 by Lisa Lord RN  Outcome: Adequate for Care Transition  11/4/2022 1620 by Lisa Lord RN  Outcome: Ongoing, Progressing  Goal: Absence of Hospital-Acquired Illness or Injury  11/4/2022 1748 by Lisa Lord RN  Outcome: Adequate for Care Transition  11/4/2022 1620 by Lisa Lord RN  Outcome: Ongoing, Progressing  Goal: Optimal Comfort and Wellbeing  11/4/2022 1748 by Lisa Lord RN  Outcome: Adequate for Care Transition  11/4/2022 1620 by Lisa Lord RN  Outcome: Ongoing, Progressing  Goal: Readiness for Transition of Care  11/4/2022 1748 by Lisa Lord RN  Outcome: Adequate for Care Transition  11/4/2022 1620 by Lisa Lord RN  Outcome: Ongoing, Progressing     Problem: Adjustment to Illness (Stroke, Hemorrhagic)  Goal: Optimal Coping  11/4/2022 1748 by Lisa Lord RN  Outcome: Adequate for Care Transition  11/4/2022 1620 by Lisa Lord RN  Outcome: Ongoing, Progressing     Problem: Bowel Elimination Impaired (Stroke, Hemorrhagic)  Goal: Effective Bowel Elimination  11/4/2022 1748 by Lisa Lord RN  Outcome: Adequate for Care Transition  11/4/2022 1620 by Lisa Lord RN  Outcome: Ongoing, Progressing     Problem: Cerebral Tissue Perfusion (Stroke, Hemorrhagic)  Goal: Optimal Cerebral Tissue Perfusion  11/4/2022 1748 by Lisa Lord RN  Outcome: Adequate for Care Transition  11/4/2022 1620 by Lisa Lord RN  Outcome: Ongoing, Progressing     Problem: Cognitive Impairment (Stroke, Hemorrhagic)  Goal: Optimal Cognitive Function  11/4/2022 1748 by Lisa Lord, RN  Outcome: Adequate for Care Transition  11/4/2022 1620 by Lisa Lord, RN  Outcome: Ongoing,  Progressing     Problem: Communication Impairment (Stroke, Hemorrhagic)  Goal: Effective Communication Skills  11/4/2022 1748 by Lisa Lord RN  Outcome: Adequate for Care Transition  11/4/2022 1620 by Lisa Lord RN  Outcome: Ongoing, Progressing     Problem: Functional Ability Impaired (Stroke, Hemorrhagic)  Goal: Optimal Functional Ability  11/4/2022 1748 by Lisa Lord RN  Outcome: Adequate for Care Transition  11/4/2022 1620 by Lisa Lord RN  Outcome: Ongoing, Progressing     Problem: Respiratory Compromise (Stroke, Hemorrhagic)  Goal: Effective Oxygenation and Ventilation  11/4/2022 1748 by Lisa Lord RN  Outcome: Adequate for Care Transition  11/4/2022 1620 by Lisa Lord RN  Outcome: Ongoing, Progressing     Problem: Sensorimotor Impairment (Stroke, Hemorrhagic)  Goal: Improved Sensorimotor Function  11/4/2022 1748 by Lisa Lord RN  Outcome: Adequate for Care Transition  11/4/2022 1620 by Lisa Lord RN  Outcome: Ongoing, Progressing     Problem: Urinary Elimination Impaired (Stroke, Hemorrhagic)  Goal: Effective Urinary Elimination  11/4/2022 1748 by Lisa Lord RN  Outcome: Adequate for Care Transition  11/4/2022 1620 by Lisa Lord RN  Outcome: Ongoing, Progressing     Problem: Fall Injury Risk  Goal: Absence of Fall and Fall-Related Injury  11/4/2022 1748 by Lisa Lord RN  Outcome: Adequate for Care Transition  11/4/2022 1620 by Lisa Lord RN  Outcome: Ongoing, Progressing     Problem: Restraint, Nonbehavioral (Nonviolent)  Goal: Absence of Harm or Injury  11/4/2022 1748 by Lisa Lord RN  Outcome: Adequate for Care Transition  11/4/2022 1620 by Lisa Lord RN  Outcome: Ongoing, Progressing     Problem: Skin Injury Risk Increased  Goal: Skin Health and Integrity  11/4/2022 1748 by Lisa Lord RN  Outcome: Adequate for Care Transition  11/4/2022 1620 by Lisa L. Blohm, RN  Outcome: Ongoing, Progressing      Problem: Impaired Wound Healing  Goal: Optimal Wound Healing  11/4/2022 1748 by Lisa Lord, RN  Outcome: Adequate for Care Transition  11/4/2022 1620 by Lisa Lord, RN  Outcome: Ongoing, Progressing

## 2022-12-02 ENCOUNTER — OFFICE VISIT (OUTPATIENT)
Dept: FAMILY MEDICINE | Facility: CLINIC | Age: 52
End: 2022-12-02
Payer: MEDICARE

## 2022-12-02 VITALS — BODY MASS INDEX: 15.04 KG/M2 | TEMPERATURE: 98 F | HEIGHT: 55 IN | WEIGHT: 65 LBS

## 2022-12-02 DIAGNOSIS — E03.9 HYPOTHYROIDISM, UNSPECIFIED TYPE: Primary | ICD-10-CM

## 2022-12-02 PROCEDURE — 99213 OFFICE O/P EST LOW 20 MIN: CPT | Mod: S$GLB,,, | Performed by: NURSE PRACTITIONER

## 2022-12-02 PROCEDURE — 99213 PR OFFICE/OUTPT VISIT, EST, LEVL III, 20-29 MIN: ICD-10-PCS | Mod: S$GLB,,, | Performed by: NURSE PRACTITIONER

## 2022-12-02 RX ORDER — LEVETIRACETAM 250 MG/1
250 TABLET ORAL EVERY 12 HOURS
Qty: 60 TABLET | Refills: 3 | Status: SHIPPED | OUTPATIENT
Start: 2022-12-02 | End: 2023-05-26 | Stop reason: SDUPTHER

## 2022-12-02 RX ORDER — LACTOSE-REDUCED FOOD 0.04G-1.05
1 LIQUID (ML) ORAL 3 TIMES DAILY PRN
Qty: 270 EACH | Refills: 1 | Status: SHIPPED | OUTPATIENT
Start: 2022-12-02

## 2022-12-02 RX ORDER — LORAZEPAM 0.5 MG/1
TABLET ORAL
Qty: 1 TABLET | Refills: 1 | Status: SHIPPED | OUTPATIENT
Start: 2022-12-02

## 2022-12-02 RX ORDER — LEVOTHYROXINE SODIUM 50 UG/1
50 TABLET ORAL
Qty: 90 TABLET | Refills: 1 | Status: SHIPPED | OUTPATIENT
Start: 2022-12-02 | End: 2023-05-26 | Stop reason: SDUPTHER

## 2022-12-02 NOTE — PROGRESS NOTES
Subjective:    Patient ID: Anabella is a 52 y.o. female.  Chief Complaint: Anabella had no chief complaint listed for this encounter.   Narrative:   Miss Potter presents in followup after experiencing a witnessed tonic-clonic seizure and fall, with subsequent subarachnoid bleed.  She has returned to baseline. Is still on Keppra, now started on thyroid replacement therapy.  Here with her caregiver, very helpful    All other systems negative except as stated above.        Review of patient's allergies indicates:   Allergen Reactions    Lactose Diarrhea    Nsaids (non-steroidal anti-inflammatory drug)      Objective:      Vitals:    12/02/22 1020   Temp: 98 °F (36.7 °C)     -WEIGHT  Body mass index is 15.67 kg/m².  Physical Exam  Vitals and nursing note reviewed.   Constitutional:       Comments: She has lost weight BMI 15   HENT:      Head: Normocephalic and atraumatic.      Right Ear: Tympanic membrane normal.      Left Ear: Tympanic membrane normal.      Nose: Nose normal.      Mouth/Throat:      Mouth: Mucous membranes are moist.      Pharynx: Oropharynx is clear.   Eyes:      Conjunctiva/sclera: Conjunctivae normal.      Pupils: Pupils are equal, round, and reactive to light.   Cardiovascular:      Rate and Rhythm: Normal rate and regular rhythm.   Pulmonary:      Effort: Pulmonary effort is normal.      Breath sounds: Normal breath sounds.   Abdominal:      General: Abdomen is flat.   Musculoskeletal:         General: Normal range of motion.      Cervical back: Normal range of motion and neck supple.   Skin:     General: Skin is warm.      Capillary Refill: Capillary refill takes less than 2 seconds.      Comments: Feet are warm, normal color   Neurological:      Mental Status: She is alert. Mental status is at baseline.      Comments: Down's Syndrome  Does not allow complete exam, limited to touch and quick auscultation of chest.         Assessment:       1. Hypothyroidism, unspecified type          Plan:        Hypothyroidism, unspecified type  -     TSH; Future; Expected date: 12/02/2022  -     T4, free; Future; Expected date: 12/02/2022  -     Comprehensive Metabolic Panel; Future; Expected date: 12/02/2022  -     CBC Auto Differential; Future; Expected date: 12/02/2022    Other orders  -     levETIRAcetam (KEPPRA) 250 MG Tab; Take 1 tablet (250 mg total) by mouth every 12 (twelve) hours.  Dispense: 60 tablet; Refill: 3  -     LORazepam (ATIVAN) 0.5 MG tablet; Take one half to one tablet prior to procedure  Dispense: 1 tablet; Refill: 1  -     food supplemt, lactose-reduced (BOOST BREEZE NUTRITIONAL) 0.04-1.05 gram-kcal/mL Liqd; Take 1 each by mouth 3 (three) times daily as needed (increase calories).  Dispense: 270 each; Refill: 1     Continue keppra and has seen Dr. Yap in followup. New EEG to be done.  One time dose of ativan as needed prior to lab, ordered for early Jan.  Followup in 1 mo

## 2022-12-27 ENCOUNTER — TELEPHONE (OUTPATIENT)
Dept: FAMILY MEDICINE | Facility: CLINIC | Age: 52
End: 2022-12-27
Payer: MEDICARE

## 2022-12-27 RX ORDER — LORATADINE 10 MG/1
10 TABLET ORAL DAILY
Qty: 30 TABLET | Refills: 2 | Status: SHIPPED | OUTPATIENT
Start: 2022-12-27 | End: 2023-12-27

## 2022-12-27 RX ORDER — ACETAMINOPHEN 500 MG
500 TABLET ORAL EVERY 6 HOURS PRN
Qty: 90 TABLET | Refills: 1 | Status: SHIPPED | OUTPATIENT
Start: 2022-12-27

## 2022-12-27 NOTE — TELEPHONE ENCOUNTER
Betsey Ms. Ocasio,  Please review the message below regarding this patient of Dr. Edmonds in his absence.  Call placed to [facility] due to Dr. Edmonds's orders for patient to take OTC antihistamines and cold meds, and tylenol. Informed by [facility] to give patient any medication they need an order for OTC medications. Asked how is patient condition today. He is a lot better but still need a prescription for OTC medication to be given a the facility.  Thank you  Signed:   Franco Juárez LPN    ----- Message from Paulino Edmonds MD sent at 12/22/2022  1:34 PM CST -----  Contact: nurse  Advise tylenol and OTC antihistamines  ----- Message -----  From: Kusum Pagan MA  Sent: 12/22/2022  11:03 AM CST  To: MD Jose Luong stated pt not running fever, just wants something to be called in for cold symptoms.  ----- Message -----  From: Rocio Jensen  Sent: 12/22/2022  10:55 AM CST  To: [facility] Staff    [facility]zym[facility]     Who Called:  n[facility] from [facility]   Symptoms (please be specific):  head cold/running nose  How long has patient had these symptoms:  yesterday  Pharmacy name and phone #:    [id number], [place] - [address]   Phone:  [phone]  Fax:  [phone]        Best Call Back Number: [phone]    Additional Information: Please advise-Thank you~

## 2023-01-17 ENCOUNTER — PATIENT MESSAGE (OUTPATIENT)
Dept: ADMINISTRATIVE | Facility: HOSPITAL | Age: 53
End: 2023-01-17
Payer: MEDICARE

## 2023-05-23 ENCOUNTER — PATIENT OUTREACH (OUTPATIENT)
Dept: ADMINISTRATIVE | Facility: HOSPITAL | Age: 53
End: 2023-05-23
Payer: MEDICARE

## 2023-05-26 ENCOUNTER — OFFICE VISIT (OUTPATIENT)
Dept: FAMILY MEDICINE | Facility: CLINIC | Age: 53
End: 2023-05-26
Payer: MEDICARE

## 2023-05-26 VITALS — WEIGHT: 63.63 LBS | BODY MASS INDEX: 14.72 KG/M2 | HEIGHT: 55 IN

## 2023-05-26 DIAGNOSIS — E78.5 HYPERLIPIDEMIA, UNSPECIFIED HYPERLIPIDEMIA TYPE: ICD-10-CM

## 2023-05-26 DIAGNOSIS — F81.9 INTELLECTUAL DELAY: ICD-10-CM

## 2023-05-26 DIAGNOSIS — E03.9 HYPOTHYROIDISM, UNSPECIFIED TYPE: Primary | ICD-10-CM

## 2023-05-26 DIAGNOSIS — Q90.9 TRISOMY 21: ICD-10-CM

## 2023-05-26 PROCEDURE — 99213 PR OFFICE/OUTPT VISIT, EST, LEVL III, 20-29 MIN: ICD-10-PCS | Mod: S$GLB,,, | Performed by: FAMILY MEDICINE

## 2023-05-26 PROCEDURE — 99213 OFFICE O/P EST LOW 20 MIN: CPT | Mod: S$GLB,,, | Performed by: FAMILY MEDICINE

## 2023-05-26 RX ORDER — LEVOTHYROXINE SODIUM 50 UG/1
50 TABLET ORAL
Qty: 90 TABLET | Refills: 1 | Status: SHIPPED | OUTPATIENT
Start: 2023-05-26

## 2023-05-26 RX ORDER — LEVETIRACETAM 250 MG/1
250 TABLET ORAL EVERY 12 HOURS
Qty: 60 TABLET | Refills: 3 | Status: SHIPPED | OUTPATIENT
Start: 2023-05-26

## 2023-05-26 RX ORDER — ATORVASTATIN CALCIUM 40 MG/1
40 TABLET, FILM COATED ORAL DAILY
Qty: 90 TABLET | Refills: 1 | Status: SHIPPED | OUTPATIENT
Start: 2023-05-26

## 2023-05-26 NOTE — PROGRESS NOTES
Subjective:       Patient ID: Anabella Potter is a 53 y.o. female.    Chief Complaint: No chief complaint on file.      Review of patient's allergies indicates:   Allergen Reactions    Lactose Diarrhea    Nsaids (non-steroidal anti-inflammatory drug)       No complaints just a check up  Had pneumonia 2 weeks ago    Review of Systems   Constitutional:  Negative for chills, fatigue, fever and unexpected weight change.   HENT:  Negative for ear pain, rhinorrhea, sinus pressure/congestion, sneezing and sore throat.    Eyes:  Negative for photophobia and pain.   Respiratory:  Negative for chest tightness, shortness of breath and wheezing.    Cardiovascular:  Negative for chest pain.   Gastrointestinal:  Negative for abdominal distention, abdominal pain, constipation, diarrhea and nausea.   Endocrine: Negative for polydipsia, polyphagia and polyuria.   Genitourinary:  Negative for dysuria, frequency, menstrual problem, pelvic pain and urgency.   Musculoskeletal:  Negative for back pain and joint swelling.   Integumentary:  Negative for rash, wound, breast mass and breast discharge.   Allergic/Immunologic: Negative for immunocompromised state.   Neurological:  Negative for dizziness, vertigo, weakness and headaches.   Psychiatric/Behavioral:  Negative for agitation, dysphoric mood and sleep disturbance.    All other systems reviewed and are negative.  Breast: Negative for mass      Objective:      There were no vitals filed for this visit.  Physical Exam  Vitals and nursing note reviewed.   Constitutional:       Appearance: Normal appearance.   HENT:      Head: Normocephalic.      Right Ear: Tympanic membrane normal.      Left Ear: Tympanic membrane normal.      Nose: Nose normal.      Mouth/Throat:      Mouth: Mucous membranes are moist.   Eyes:      Pupils: Pupils are equal, round, and reactive to light.   Cardiovascular:      Rate and Rhythm: Normal rate and regular rhythm.   Pulmonary:      Effort: Pulmonary effort is  normal.   Abdominal:      General: Abdomen is flat. Bowel sounds are normal.      Palpations: Abdomen is soft.   Musculoskeletal:         General: Normal range of motion.   Skin:     General: Skin is warm and dry.   Neurological:      General: No focal deficit present.      Mental Status: She is alert.      Comments: Flat fascies, a little agitated when examenedmoves all extremities   Psychiatric:         Mood and Affect: Mood normal.         Behavior: Behavior normal.       Assessment:       1. Hypothyroidism, unspecified type    2. Trisomy 21    3. Intellectual delay    4. Hyperlipidemia, unspecified hyperlipidemia type        Plan:       Hypothyroidism, unspecified type    Trisomy 21    Intellectual delay    Hyperlipidemia, unspecified hyperlipidemia type           No follow-ups on file.

## 2023-07-11 ENCOUNTER — PATIENT OUTREACH (OUTPATIENT)
Dept: ADMINISTRATIVE | Facility: HOSPITAL | Age: 53
End: 2023-07-11
Payer: MEDICARE

## 2023-07-11 ENCOUNTER — PATIENT MESSAGE (OUTPATIENT)
Dept: ADMINISTRATIVE | Facility: HOSPITAL | Age: 53
End: 2023-07-11
Payer: MEDICARE

## 2023-07-11 NOTE — PROGRESS NOTES
Population Health Chart Review & Patient Outreach Details:     Reason for Outreach Encounter:     [x]  Non-Compliant Report   []  Payor Report ([employer], PHN, BCBS, MSSP, MCIP, C, etc.)   []  Pre-Visit Chart Review     Updates Requested / Reviewed:     [x]  Care Everywhere    [x]     []  External Sources ([facility], [facility], DIS, etc.)   []  Care Team Updated    Patient Outreach Method:    [x]  Telephone Outreach Completed   [] Successful   [] Left Voicemail   [x] Unable to Contact (wrong number, no voicemail)  [x]  [facility] Outreach Sent  []  Letter Outreach Mailed  []  Fax Sent for External Records  []  External Records Upload    Health Maintenance Topics Addressed and Outreach Outcomes / Actions Taken:        [x]      Breast Cancer Screening []  Mammo Scheduled      []  External Records Requested     []  Added Reminder to Complete to Upcoming Primary Care Appt Notes     []  Patient Declined     []  Patient Will Call Back to Schedule     []  Patient Will Schedule with External Provider / Order Routed if Applicable             []       Cervical Cancer Screening []  Pap Scheduled      []  External Records Requested     []  Added Reminder to Complete to Upcoming Primary Care Appt Notes     []  Patient Declined     []  Patient Will Call Back to Schedule     []  Patient Will Schedule with External Provider               []          Colorectal Cancer Screening []  Colonoscopy Case Request or Referral Placed     []  External Records Requested     []  Added Reminder to Complete to Upcoming Primary Care Appt Notes     []  Patient Declined     []  Patient Will Call Back to Schedule     []  Patient Will Schedule with External Provider     []  Fit Kit Mailed (add the SmartPhrase under additional notes)     []  Reminded Patient to Complete Home Test             []      Diabetic Eye Exam []  Eye Camera Scheduled or Optometry Referral Placed     []  External Records Requested     []  Added Reminder to Complete  to Upcoming Primary Care Appt Notes     []  Patient Declined     []  Patient Will Call Back to Schedule     []  Patient Will Schedule with External Provider             []      Blood Pressure Control []  Primary Care Follow Up Visit Scheduled     []  Remote Blood Pressure Reading Captured     []  Added Reminder to Complete to Upcoming Primary Care Appt Notes     []  Patient Declined     []  Patient Will Call Back / Patient Will Send Portal Message with Reading     []  Patient Will Call Back to Schedule Provider Visit             []       HbA1c & Other Labs []  Lab Appt Scheduled for Due Labs     []  Primary Care Follow Up Visit Scheduled      []  Reminded Patient to Complete Home Test     []  Added Reminder to Complete to Upcoming Primary Care Appt Notes     []  Patient Declined     []  Patient Will Call Back to Schedule     []  Patient Will Schedule with External Provider / Order Routed if Applicable           []    Schedule Primary Care Appt []  Primary Care Appt Scheduled     []  Patient Declined     []  Patient Will Call Back to Schedule     []  Pt Established with External Provider & Updated Care Team             []      Medication Adherence []  Primary Care Appointment Scheduled     []  Added Reminder to Upcoming Primary Care Appt Notes     []  Patient Reminded to  Prescription     []  Patient Declined, Provider Notified if Needed     []  Sent Provider Message to Review and/or Add Exclusion to Problem List             []      Osteoporosis Screening []  DXA Appointment Scheduled     []  External Records Requested     []  Added Reminder to Complete to Upcoming Primary Care Appt Notes     []  Patient Declined     []  Patient Will Call Back to Schedule     []  Patient Will Schedule with External Provider / Order Routed if Applicable     Additional Care Coordinator Notes:         Further Action Needed If Patient Returns Outreach:

## 2023-09-08 ENCOUNTER — PATIENT MESSAGE (OUTPATIENT)
Dept: FAMILY MEDICINE | Facility: CLINIC | Age: 53
End: 2023-09-08

## 2023-09-08 ENCOUNTER — OFFICE VISIT (OUTPATIENT)
Dept: FAMILY MEDICINE | Facility: CLINIC | Age: 53
End: 2023-09-08
Payer: MEDICARE

## 2023-09-08 VITALS — HEIGHT: 55 IN | TEMPERATURE: 98 F | BODY MASS INDEX: 15.28 KG/M2 | WEIGHT: 66 LBS

## 2023-09-08 DIAGNOSIS — R34 DECREASED URINATION: ICD-10-CM

## 2023-09-08 DIAGNOSIS — J18.9 PNEUMONIA DUE TO INFECTIOUS ORGANISM, UNSPECIFIED LATERALITY, UNSPECIFIED PART OF LUNG: Primary | ICD-10-CM

## 2023-09-08 LAB
BILIRUBIN, UA POC OHS: NEGATIVE
BLOOD, UA POC OHS: NEGATIVE
CLARITY, UA POC OHS: ABNORMAL
COLOR, UA POC OHS: YELLOW
GLUCOSE, UA POC OHS: NEGATIVE
KETONES, UA POC OHS: NEGATIVE
LEUKOCYTES, UA POC OHS: NEGATIVE
NITRITE, UA POC OHS: NEGATIVE
PH, UA POC OHS: 7
PROTEIN, UA POC OHS: NEGATIVE
SPECIFIC GRAVITY, UA POC OHS: 1.02
UROBILINOGEN, UA POC OHS: 0.2

## 2023-09-08 PROCEDURE — 81003 POCT URINALYSIS(INSTRUMENT): ICD-10-PCS | Mod: QW,S$GLB,, | Performed by: NURSE PRACTITIONER

## 2023-09-08 PROCEDURE — 81003 URINALYSIS AUTO W/O SCOPE: CPT | Mod: QW,S$GLB,, | Performed by: NURSE PRACTITIONER

## 2023-09-08 PROCEDURE — 99213 OFFICE O/P EST LOW 20 MIN: CPT | Mod: S$GLB,,, | Performed by: NURSE PRACTITIONER

## 2023-09-08 PROCEDURE — 99213 PR OFFICE/OUTPT VISIT, EST, LEVL III, 20-29 MIN: ICD-10-PCS | Mod: S$GLB,,, | Performed by: NURSE PRACTITIONER

## 2023-09-15 NOTE — PROGRESS NOTES
Subjective:    Patient ID: Anabella is a 53 y.o. female.  Chief Complaint: Anabella had concerns including [facility] PHYSICAL.   Narrative:   Miss Potter is here with her caregiver for her annual exam  She has had a deep cough.  Weight is stable.        All other systems negative except as stated above.        Review of patient's allergies indicates:   Allergen Reactions    Lactose Diarrhea    Nsaids (non-steroidal anti-inflammatory drug)      Objective:      Vitals:    09/08/23 0949   Temp: 97.5 °F (36.4 °C)     -WEIGHT  Body mass index is 15.91 kg/m².  Physical Exam  Vitals and nursing note reviewed.   HENT:      Head: Normocephalic.      Mouth/Throat:      Mouth: Mucous membranes are moist.      Pharynx: Oropharynx is clear.   Eyes:      Conjunctiva/sclera: Conjunctivae normal.      Pupils: Pupils are equal, round, and reactive to light.   Pulmonary:      Effort: Pulmonary effort is normal.   Abdominal:      General: Abdomen is flat. Bowel sounds are normal.      Palpations: Abdomen is soft.   Musculoskeletal:         General: Normal range of motion.      Cervical back: Normal range of motion.   Skin:     General: Skin is warm.   Neurological:      Mental Status: She is alert. Mental status is at baseline.   Psychiatric:         Mood and Affect: Mood normal.      Comments: Allows minimal exam           Assessment and Plan:   1. Pneumonia due to infectious organism, unspecified laterality, unspecified part of lung  -     X-Ray Chest PA And Lateral; Future; Expected date: 09/08/2023    2. Decreased urination  -     POCT Urinalysis(Instrument)     Do CXR, UA  Paperwork is completed

## 2023-12-04 ENCOUNTER — OFFICE VISIT (OUTPATIENT)
Dept: FAMILY MEDICINE | Facility: CLINIC | Age: 53
End: 2023-12-04
Payer: MEDICARE

## 2023-12-04 VITALS — BODY MASS INDEX: 16.11 KG/M2 | HEIGHT: 55 IN | WEIGHT: 69.63 LBS

## 2023-12-04 DIAGNOSIS — F81.9 INTELLECTUAL DELAY: ICD-10-CM

## 2023-12-04 DIAGNOSIS — N30.00 ACUTE CYSTITIS WITHOUT HEMATURIA: ICD-10-CM

## 2023-12-04 DIAGNOSIS — Q90.9 DOWN SYNDROME: Primary | ICD-10-CM

## 2023-12-04 DIAGNOSIS — Q90.9 TRISOMY 21: ICD-10-CM

## 2023-12-04 DIAGNOSIS — L30.9 DERMATITIS: ICD-10-CM

## 2023-12-04 PROCEDURE — 99213 PR OFFICE/OUTPT VISIT, EST, LEVL III, 20-29 MIN: ICD-10-PCS | Mod: S$GLB,,, | Performed by: FAMILY MEDICINE

## 2023-12-04 PROCEDURE — 99213 OFFICE O/P EST LOW 20 MIN: CPT | Mod: S$GLB,,, | Performed by: FAMILY MEDICINE

## 2023-12-04 RX ORDER — TERBINAFINE HYDROCHLORIDE 250 MG/1
250 TABLET ORAL DAILY
Qty: 30 TABLET | Refills: 2 | Status: SHIPPED | OUTPATIENT
Start: 2023-12-04

## 2023-12-04 NOTE — PROGRESS NOTES
Subjective:       Patient ID: Anabella Potter is a 53 y.o. female.    Chief Complaint: Follow-up (ER VISIT )      Review of patient's allergies indicates:   Allergen Reactions    Lactose Diarrhea    Nsaids (non-steroidal anti-inflammatory drug)       Follow up UTI admitted one day at [facility] 11/4    Follow-up  Pertinent negatives include no abdominal pain, chest pain, chills, fatigue, fever, headaches, joint swelling, nausea, rash, sore throat, vertigo or weakness.     Review of Systems   Constitutional:  Negative for chills, fatigue, fever and unexpected weight change.   HENT:  Negative for ear pain, rhinorrhea, sinus pressure/congestion, sneezing and sore throat.    Eyes:  Negative for photophobia and pain.   Respiratory:  Negative for chest tightness, shortness of breath and wheezing.    Cardiovascular:  Negative for chest pain.   Gastrointestinal:  Negative for abdominal distention, abdominal pain, constipation, diarrhea and nausea.   Endocrine: Negative for polydipsia, polyphagia and polyuria.   Genitourinary:  Negative for dysuria, frequency, menstrual problem, pelvic pain and urgency.   Musculoskeletal:  Negative for back pain and joint swelling.   Integumentary:  Negative for rash, wound, breast mass and breast discharge.   Allergic/Immunologic: Negative for immunocompromised state.   Neurological:  Negative for dizziness, vertigo, weakness and headaches.   Psychiatric/Behavioral:  Negative for agitation, dysphoric mood and sleep disturbance.    All other systems reviewed and are negative.  Breast: Negative for mass        Objective:      There were no vitals filed for this visit.  Physical Exam  Vitals and nursing note reviewed.   HENT:      Head: Normocephalic.      Right Ear: External ear normal.      Left Ear: External ear normal.      Nose: Nose normal.      Mouth/Throat:      Mouth: Mucous membranes are moist.   Cardiovascular:      Rate and Rhythm: Normal rate.      Pulses: Normal pulses.    Pulmonary:      Effort: Pulmonary effort is normal.   Musculoskeletal:         General: Normal range of motion.      Cervical back: Neck supple.   Skin:     General: Skin is warm.   Neurological:      General: No focal deficit present.      Mental Status: She is alert.   Psychiatric:         Mood and Affect: Mood normal.         Assessment:       1. Down syndrome    2. Acute cystitis without hematuria    3. Trisomy 21    4. Intellectual delay    5. Dermatitis        Plan:       Down syndrome    Acute cystitis without hematuria    Trisomy 21    Intellectual delay    Dermatitis    Other orders  -     terbinafine HCL (LAMISIL) 250 mg tablet; Take 1 tablet (250 mg total) by mouth once daily.  Dispense: 30 tablet; Refill: 2           Follow up in about 6 months (around 6/4/2024).